# Patient Record
Sex: FEMALE | Race: WHITE | NOT HISPANIC OR LATINO | Employment: FULL TIME | URBAN - METROPOLITAN AREA
[De-identification: names, ages, dates, MRNs, and addresses within clinical notes are randomized per-mention and may not be internally consistent; named-entity substitution may affect disease eponyms.]

---

## 2017-11-07 ENCOUNTER — APPOINTMENT (OUTPATIENT)
Dept: WOMENS IMAGING | Facility: HOSPITAL | Age: 67
End: 2017-11-07

## 2017-11-07 PROCEDURE — G0202 SCR MAMMO BI INCL CAD: HCPCS | Performed by: RADIOLOGY

## 2017-11-29 ENCOUNTER — APPOINTMENT (OUTPATIENT)
Dept: GENERAL RADIOLOGY | Facility: HOSPITAL | Age: 67
End: 2017-11-29

## 2017-11-29 ENCOUNTER — HOSPITAL ENCOUNTER (OUTPATIENT)
Facility: HOSPITAL | Age: 67
Setting detail: OBSERVATION
Discharge: HOME | End: 2017-12-01
Attending: EMERGENCY MEDICINE | Admitting: INTERNAL MEDICINE

## 2017-11-29 DIAGNOSIS — E11.9 DIABETES MELLITUS, NEW ONSET (HCC): ICD-10-CM

## 2017-11-29 DIAGNOSIS — R73.9 HYPERGLYCEMIA: Primary | ICD-10-CM

## 2017-11-29 PROBLEM — E11.00 DIABETES MELLITUS WITH NONKETOTIC HYPEROSMOLARITY (HCC): Status: ACTIVE | Noted: 2017-11-29

## 2017-11-29 PROBLEM — I10 ESSENTIAL HYPERTENSION: Status: ACTIVE | Noted: 2017-11-29

## 2017-11-29 LAB
ALBUMIN SERPL-MCNC: 4.5 G/DL (ref 3.5–5.2)
ALBUMIN/GLOB SERPL: 1.6 G/DL
ALP SERPL-CCNC: 131 U/L (ref 39–117)
ALT SERPL W P-5'-P-CCNC: 29 U/L (ref 1–33)
ANION GAP SERPL CALCULATED.3IONS-SCNC: 12.1 MMOL/L
AST SERPL-CCNC: 22 U/L (ref 1–32)
BASOPHILS # BLD AUTO: 0.05 10*3/MM3 (ref 0–0.2)
BASOPHILS NFR BLD AUTO: 0.9 % (ref 0–1.5)
BILIRUB SERPL-MCNC: 0.4 MG/DL (ref 0.1–1.2)
BILIRUB UR QL STRIP: NEGATIVE
BUN BLD-MCNC: 16 MG/DL (ref 8–23)
BUN/CREAT SERPL: 18.4 (ref 7–25)
CALCIUM SPEC-SCNC: 9.5 MG/DL (ref 8.6–10.5)
CHLORIDE SERPL-SCNC: 92 MMOL/L (ref 98–107)
CLARITY UR: CLEAR
CO2 SERPL-SCNC: 26.9 MMOL/L (ref 22–29)
COLOR UR: YELLOW
CREAT BLD-MCNC: 0.87 MG/DL (ref 0.57–1)
DEPRECATED RDW RBC AUTO: 39.4 FL (ref 37–54)
EOSINOPHIL # BLD AUTO: 0.11 10*3/MM3 (ref 0–0.7)
EOSINOPHIL NFR BLD AUTO: 1.9 % (ref 0.3–6.2)
ERYTHROCYTE [DISTWIDTH] IN BLOOD BY AUTOMATED COUNT: 13 % (ref 11.7–13)
GFR SERPL CREATININE-BSD FRML MDRD: 65 ML/MIN/1.73
GLOBULIN UR ELPH-MCNC: 2.9 GM/DL
GLUCOSE BLD-MCNC: 611 MG/DL (ref 65–99)
GLUCOSE BLDC GLUCOMTR-MCNC: 258 MG/DL (ref 70–130)
GLUCOSE BLDC GLUCOMTR-MCNC: 342 MG/DL (ref 70–130)
GLUCOSE BLDC GLUCOMTR-MCNC: 352 MG/DL (ref 70–130)
GLUCOSE UR STRIP-MCNC: ABNORMAL MG/DL
HBA1C MFR BLD: 10.7 % (ref 4.8–5.6)
HCT VFR BLD AUTO: 35.4 % (ref 35.6–45.5)
HGB BLD-MCNC: 12.1 G/DL (ref 11.9–15.5)
HGB UR QL STRIP.AUTO: NEGATIVE
HOLD SPECIMEN: NORMAL
HOLD SPECIMEN: NORMAL
IMM GRANULOCYTES # BLD: 0 10*3/MM3 (ref 0–0.03)
IMM GRANULOCYTES NFR BLD: 0 % (ref 0–0.5)
KETONES UR QL STRIP: NEGATIVE
LEUKOCYTE ESTERASE UR QL STRIP.AUTO: NEGATIVE
LYMPHOCYTES # BLD AUTO: 1.81 10*3/MM3 (ref 0.9–4.8)
LYMPHOCYTES NFR BLD AUTO: 31 % (ref 19.6–45.3)
MAGNESIUM SERPL-MCNC: 2.1 MG/DL (ref 1.6–2.4)
MCH RBC QN AUTO: 28.5 PG (ref 26.9–32)
MCHC RBC AUTO-ENTMCNC: 34.2 G/DL (ref 32.4–36.3)
MCV RBC AUTO: 83.5 FL (ref 80.5–98.2)
MONOCYTES # BLD AUTO: 0.34 10*3/MM3 (ref 0.2–1.2)
MONOCYTES NFR BLD AUTO: 5.8 % (ref 5–12)
NEUTROPHILS # BLD AUTO: 3.52 10*3/MM3 (ref 1.9–8.1)
NEUTROPHILS NFR BLD AUTO: 60.4 % (ref 42.7–76)
NITRITE UR QL STRIP: NEGATIVE
PH UR STRIP.AUTO: <=5 [PH] (ref 5–8)
PLATELET # BLD AUTO: 258 10*3/MM3 (ref 140–500)
PMV BLD AUTO: 10.9 FL (ref 6–12)
POTASSIUM BLD-SCNC: 5.1 MMOL/L (ref 3.5–5.2)
PROT SERPL-MCNC: 7.4 G/DL (ref 6–8.5)
PROT UR QL STRIP: NEGATIVE
RBC # BLD AUTO: 4.24 10*6/MM3 (ref 3.9–5.2)
SODIUM BLD-SCNC: 131 MMOL/L (ref 136–145)
SP GR UR STRIP: >=1.03 (ref 1–1.03)
TROPONIN T SERPL-MCNC: <0.01 NG/ML (ref 0–0.03)
TSH SERPL DL<=0.05 MIU/L-ACNC: 4.48 MIU/ML (ref 0.27–4.2)
UROBILINOGEN UR QL STRIP: ABNORMAL
WBC NRBC COR # BLD: 5.83 10*3/MM3 (ref 4.5–10.7)
WHOLE BLOOD HOLD SPECIMEN: NORMAL
WHOLE BLOOD HOLD SPECIMEN: NORMAL

## 2017-11-29 PROCEDURE — 63710000001 INSULIN REGULAR HUMAN PER 5 UNITS: Performed by: EMERGENCY MEDICINE

## 2017-11-29 PROCEDURE — 85025 COMPLETE CBC W/AUTO DIFF WBC: CPT | Performed by: EMERGENCY MEDICINE

## 2017-11-29 PROCEDURE — 83036 HEMOGLOBIN GLYCOSYLATED A1C: CPT | Performed by: INTERNAL MEDICINE

## 2017-11-29 PROCEDURE — 99284 EMERGENCY DEPT VISIT MOD MDM: CPT

## 2017-11-29 PROCEDURE — 84484 ASSAY OF TROPONIN QUANT: CPT | Performed by: EMERGENCY MEDICINE

## 2017-11-29 PROCEDURE — G0378 HOSPITAL OBSERVATION PER HR: HCPCS

## 2017-11-29 PROCEDURE — 84443 ASSAY THYROID STIM HORMONE: CPT | Performed by: INTERNAL MEDICINE

## 2017-11-29 PROCEDURE — 93010 ELECTROCARDIOGRAM REPORT: CPT | Performed by: INTERNAL MEDICINE

## 2017-11-29 PROCEDURE — 83735 ASSAY OF MAGNESIUM: CPT | Performed by: EMERGENCY MEDICINE

## 2017-11-29 PROCEDURE — 71020 HC CHEST PA AND LATERAL: CPT

## 2017-11-29 PROCEDURE — 80053 COMPREHEN METABOLIC PANEL: CPT | Performed by: EMERGENCY MEDICINE

## 2017-11-29 PROCEDURE — 82962 GLUCOSE BLOOD TEST: CPT

## 2017-11-29 PROCEDURE — 93005 ELECTROCARDIOGRAM TRACING: CPT | Performed by: EMERGENCY MEDICINE

## 2017-11-29 PROCEDURE — 81003 URINALYSIS AUTO W/O SCOPE: CPT | Performed by: EMERGENCY MEDICINE

## 2017-11-29 PROCEDURE — 36415 COLL VENOUS BLD VENIPUNCTURE: CPT | Performed by: EMERGENCY MEDICINE

## 2017-11-29 PROCEDURE — 63710000001 INSULIN ASPART PER 5 UNITS: Performed by: INTERNAL MEDICINE

## 2017-11-29 RX ORDER — AMLODIPINE BESYLATE 5 MG/1
5 TABLET ORAL DAILY
Status: DISCONTINUED | OUTPATIENT
Start: 2017-11-30 | End: 2017-12-01 | Stop reason: HOSPADM

## 2017-11-29 RX ORDER — NICOTINE POLACRILEX 4 MG
15 LOZENGE BUCCAL
Status: DISCONTINUED | OUTPATIENT
Start: 2017-11-29 | End: 2017-12-01 | Stop reason: HOSPADM

## 2017-11-29 RX ORDER — LOVASTATIN 40 MG/1
40 TABLET ORAL DAILY
COMMUNITY

## 2017-11-29 RX ORDER — SODIUM CHLORIDE 9 MG/ML
75 INJECTION, SOLUTION INTRAVENOUS CONTINUOUS
Status: DISCONTINUED | OUTPATIENT
Start: 2017-11-29 | End: 2017-12-01 | Stop reason: HOSPADM

## 2017-11-29 RX ORDER — AMLODIPINE BESYLATE AND BENAZEPRIL HYDROCHLORIDE 5; 10 MG/1; MG/1
1 CAPSULE ORAL
Status: ON HOLD | COMMUNITY
End: 2018-04-03

## 2017-11-29 RX ORDER — ATORVASTATIN CALCIUM 20 MG/1
40 TABLET, FILM COATED ORAL DAILY
Status: DISCONTINUED | OUTPATIENT
Start: 2017-11-30 | End: 2017-12-01 | Stop reason: HOSPADM

## 2017-11-29 RX ORDER — ACETAMINOPHEN 325 MG/1
650 TABLET ORAL EVERY 4 HOURS PRN
Status: DISCONTINUED | OUTPATIENT
Start: 2017-11-29 | End: 2017-12-01 | Stop reason: HOSPADM

## 2017-11-29 RX ORDER — ONDANSETRON 2 MG/ML
4 INJECTION INTRAMUSCULAR; INTRAVENOUS EVERY 6 HOURS PRN
Status: DISCONTINUED | OUTPATIENT
Start: 2017-11-29 | End: 2017-12-01 | Stop reason: HOSPADM

## 2017-11-29 RX ORDER — SODIUM CHLORIDE 0.9 % (FLUSH) 0.9 %
1-10 SYRINGE (ML) INJECTION AS NEEDED
Status: DISCONTINUED | OUTPATIENT
Start: 2017-11-29 | End: 2017-12-01 | Stop reason: HOSPADM

## 2017-11-29 RX ORDER — LEVOTHYROXINE SODIUM 0.05 MG/1
50 TABLET ORAL
Status: DISCONTINUED | OUTPATIENT
Start: 2017-11-30 | End: 2017-12-01 | Stop reason: HOSPADM

## 2017-11-29 RX ORDER — SODIUM CHLORIDE 0.9 % (FLUSH) 0.9 %
10 SYRINGE (ML) INJECTION AS NEEDED
Status: DISCONTINUED | OUTPATIENT
Start: 2017-11-29 | End: 2017-12-01 | Stop reason: HOSPADM

## 2017-11-29 RX ORDER — LEVOTHYROXINE SODIUM 0.1 MG/1
50 TABLET ORAL DAILY
COMMUNITY

## 2017-11-29 RX ORDER — DEXTROSE MONOHYDRATE 25 G/50ML
25 INJECTION, SOLUTION INTRAVENOUS
Status: DISCONTINUED | OUTPATIENT
Start: 2017-11-29 | End: 2017-12-01 | Stop reason: HOSPADM

## 2017-11-29 RX ORDER — MELOXICAM 15 MG/1
15 TABLET ORAL DAILY
COMMUNITY
End: 2020-10-09 | Stop reason: HOSPADM

## 2017-11-29 RX ORDER — AMLODIPINE BESYLATE AND BENAZEPRIL HYDROCHLORIDE 5; 10 MG/1; MG/1
1 CAPSULE ORAL
Status: DISCONTINUED | OUTPATIENT
Start: 2017-11-30 | End: 2017-11-29 | Stop reason: CLARIF

## 2017-11-29 RX ORDER — LISINOPRIL 10 MG/1
10 TABLET ORAL DAILY
Status: DISCONTINUED | OUTPATIENT
Start: 2017-11-30 | End: 2017-12-01 | Stop reason: HOSPADM

## 2017-11-29 RX ADMIN — SODIUM CHLORIDE 1000 ML: 9 INJECTION, SOLUTION INTRAVENOUS at 15:32

## 2017-11-29 RX ADMIN — INSULIN ASPART 5 UNITS: 100 INJECTION, SOLUTION INTRAVENOUS; SUBCUTANEOUS at 18:22

## 2017-11-29 RX ADMIN — SODIUM CHLORIDE 125 ML/HR: 9 INJECTION, SOLUTION INTRAVENOUS at 16:45

## 2017-11-29 RX ADMIN — INSULIN ASPART 4 UNITS: 100 INJECTION, SOLUTION INTRAVENOUS; SUBCUTANEOUS at 22:01

## 2017-11-29 RX ADMIN — HUMAN INSULIN 5 UNITS: 100 INJECTION, SOLUTION SUBCUTANEOUS at 15:25

## 2017-11-30 PROBLEM — E03.9 HYPOTHYROIDISM: Status: ACTIVE | Noted: 2017-11-30

## 2017-11-30 LAB
ANION GAP SERPL CALCULATED.3IONS-SCNC: 12.5 MMOL/L
BASOPHILS # BLD AUTO: 0.04 10*3/MM3 (ref 0–0.2)
BASOPHILS NFR BLD AUTO: 0.6 % (ref 0–1.5)
BUN BLD-MCNC: 8 MG/DL (ref 8–23)
BUN/CREAT SERPL: 14.3 (ref 7–25)
CALCIUM SPEC-SCNC: 8.9 MG/DL (ref 8.6–10.5)
CHLORIDE SERPL-SCNC: 106 MMOL/L (ref 98–107)
CO2 SERPL-SCNC: 22.5 MMOL/L (ref 22–29)
CREAT BLD-MCNC: 0.56 MG/DL (ref 0.57–1)
DEPRECATED RDW RBC AUTO: 39.2 FL (ref 37–54)
EOSINOPHIL # BLD AUTO: 0.22 10*3/MM3 (ref 0–0.7)
EOSINOPHIL NFR BLD AUTO: 3.1 % (ref 0.3–6.2)
ERYTHROCYTE [DISTWIDTH] IN BLOOD BY AUTOMATED COUNT: 12.9 % (ref 11.7–13)
GFR SERPL CREATININE-BSD FRML MDRD: 109 ML/MIN/1.73
GLUCOSE BLD-MCNC: 171 MG/DL (ref 65–99)
GLUCOSE BLDC GLUCOMTR-MCNC: 252 MG/DL (ref 70–130)
GLUCOSE BLDC GLUCOMTR-MCNC: 266 MG/DL (ref 70–130)
GLUCOSE BLDC GLUCOMTR-MCNC: 90 MG/DL (ref 70–130)
HCT VFR BLD AUTO: 35 % (ref 35.6–45.5)
HGB BLD-MCNC: 11.8 G/DL (ref 11.9–15.5)
IMM GRANULOCYTES # BLD: 0 10*3/MM3 (ref 0–0.03)
IMM GRANULOCYTES NFR BLD: 0 % (ref 0–0.5)
LYMPHOCYTES # BLD AUTO: 1.64 10*3/MM3 (ref 0.9–4.8)
LYMPHOCYTES NFR BLD AUTO: 23.5 % (ref 19.6–45.3)
MAGNESIUM SERPL-MCNC: 1.9 MG/DL (ref 1.6–2.4)
MCH RBC QN AUTO: 28.4 PG (ref 26.9–32)
MCHC RBC AUTO-ENTMCNC: 33.7 G/DL (ref 32.4–36.3)
MCV RBC AUTO: 84.3 FL (ref 80.5–98.2)
MONOCYTES # BLD AUTO: 0.42 10*3/MM3 (ref 0.2–1.2)
MONOCYTES NFR BLD AUTO: 6 % (ref 5–12)
NEUTROPHILS # BLD AUTO: 4.67 10*3/MM3 (ref 1.9–8.1)
NEUTROPHILS NFR BLD AUTO: 66.8 % (ref 42.7–76)
PHOSPHATE SERPL-MCNC: 3.8 MG/DL (ref 2.5–4.5)
PLATELET # BLD AUTO: 241 10*3/MM3 (ref 140–500)
PMV BLD AUTO: 11 FL (ref 6–12)
POTASSIUM BLD-SCNC: 3.8 MMOL/L (ref 3.5–5.2)
RBC # BLD AUTO: 4.15 10*6/MM3 (ref 3.9–5.2)
SODIUM BLD-SCNC: 141 MMOL/L (ref 136–145)
WBC NRBC COR # BLD: 6.99 10*3/MM3 (ref 4.5–10.7)

## 2017-11-30 PROCEDURE — 96372 THER/PROPH/DIAG INJ SC/IM: CPT

## 2017-11-30 PROCEDURE — 63710000001 INSULIN DETEMER PER 5 UNITS: Performed by: INTERNAL MEDICINE

## 2017-11-30 PROCEDURE — 83735 ASSAY OF MAGNESIUM: CPT | Performed by: INTERNAL MEDICINE

## 2017-11-30 PROCEDURE — 82962 GLUCOSE BLOOD TEST: CPT

## 2017-11-30 PROCEDURE — 25010000002 ENOXAPARIN PER 10 MG: Performed by: INTERNAL MEDICINE

## 2017-11-30 PROCEDURE — 80048 BASIC METABOLIC PNL TOTAL CA: CPT | Performed by: INTERNAL MEDICINE

## 2017-11-30 PROCEDURE — G0378 HOSPITAL OBSERVATION PER HR: HCPCS

## 2017-11-30 PROCEDURE — 63710000001 INSULIN ASPART PER 5 UNITS: Performed by: INTERNAL MEDICINE

## 2017-11-30 PROCEDURE — 96360 HYDRATION IV INFUSION INIT: CPT

## 2017-11-30 PROCEDURE — 84100 ASSAY OF PHOSPHORUS: CPT | Performed by: INTERNAL MEDICINE

## 2017-11-30 PROCEDURE — 96361 HYDRATE IV INFUSION ADD-ON: CPT

## 2017-11-30 PROCEDURE — 85025 COMPLETE CBC W/AUTO DIFF WBC: CPT | Performed by: INTERNAL MEDICINE

## 2017-11-30 RX ADMIN — INSULIN ASPART 4 UNITS: 100 INJECTION, SOLUTION INTRAVENOUS; SUBCUTANEOUS at 21:45

## 2017-11-30 RX ADMIN — ATORVASTATIN CALCIUM 40 MG: 20 TABLET, FILM COATED ORAL at 08:26

## 2017-11-30 RX ADMIN — METOPROLOL TARTRATE 25 MG: 25 TABLET ORAL at 00:23

## 2017-11-30 RX ADMIN — INSULIN DETEMIR 15 UNITS: 100 INJECTION, SOLUTION SUBCUTANEOUS at 21:46

## 2017-11-30 RX ADMIN — LISINOPRIL 10 MG: 10 TABLET ORAL at 08:26

## 2017-11-30 RX ADMIN — ENOXAPARIN SODIUM 40 MG: 40 INJECTION SUBCUTANEOUS at 00:23

## 2017-11-30 RX ADMIN — INSULIN ASPART 2 UNITS: 100 INJECTION, SOLUTION INTRAVENOUS; SUBCUTANEOUS at 18:29

## 2017-11-30 RX ADMIN — METOPROLOL TARTRATE 25 MG: 25 TABLET ORAL at 08:26

## 2017-11-30 RX ADMIN — SODIUM CHLORIDE 125 ML/HR: 9 INJECTION, SOLUTION INTRAVENOUS at 16:01

## 2017-11-30 RX ADMIN — METFORMIN HYDROCHLORIDE 500 MG: 500 TABLET ORAL at 18:29

## 2017-11-30 RX ADMIN — METOPROLOL TARTRATE 25 MG: 25 TABLET ORAL at 21:44

## 2017-11-30 RX ADMIN — LEVOTHYROXINE SODIUM 50 MCG: 50 TABLET ORAL at 08:26

## 2017-11-30 RX ADMIN — INSULIN ASPART 5 UNITS: 100 INJECTION, SOLUTION INTRAVENOUS; SUBCUTANEOUS at 08:26

## 2017-11-30 RX ADMIN — INSULIN ASPART 2 UNITS: 100 INJECTION, SOLUTION INTRAVENOUS; SUBCUTANEOUS at 08:26

## 2017-11-30 RX ADMIN — INSULIN ASPART 4 UNITS: 100 INJECTION, SOLUTION INTRAVENOUS; SUBCUTANEOUS at 11:56

## 2017-11-30 RX ADMIN — INSULIN ASPART 5 UNITS: 100 INJECTION, SOLUTION INTRAVENOUS; SUBCUTANEOUS at 11:55

## 2017-11-30 RX ADMIN — SODIUM CHLORIDE 125 ML/HR: 9 INJECTION, SOLUTION INTRAVENOUS at 00:24

## 2017-11-30 RX ADMIN — ENOXAPARIN SODIUM 40 MG: 40 INJECTION SUBCUTANEOUS at 23:11

## 2017-11-30 RX ADMIN — INSULIN DETEMIR 15 UNITS: 100 INJECTION, SOLUTION SUBCUTANEOUS at 01:09

## 2017-11-30 RX ADMIN — SODIUM CHLORIDE 125 ML/HR: 9 INJECTION, SOLUTION INTRAVENOUS at 07:46

## 2017-11-30 RX ADMIN — AMLODIPINE BESYLATE 5 MG: 5 TABLET ORAL at 08:26

## 2017-12-01 VITALS
WEIGHT: 151.6 LBS | HEART RATE: 80 BPM | BODY MASS INDEX: 24.36 KG/M2 | HEIGHT: 66 IN | TEMPERATURE: 97.5 F | OXYGEN SATURATION: 98 % | SYSTOLIC BLOOD PRESSURE: 137 MMHG | RESPIRATION RATE: 16 BRPM | DIASTOLIC BLOOD PRESSURE: 78 MMHG

## 2017-12-01 LAB
ANION GAP SERPL CALCULATED.3IONS-SCNC: 11.4 MMOL/L
BUN BLD-MCNC: 7 MG/DL (ref 8–23)
BUN/CREAT SERPL: 10.9 (ref 7–25)
CALCIUM SPEC-SCNC: 8.9 MG/DL (ref 8.6–10.5)
CHLORIDE SERPL-SCNC: 100 MMOL/L (ref 98–107)
CO2 SERPL-SCNC: 23.6 MMOL/L (ref 22–29)
CREAT BLD-MCNC: 0.64 MG/DL (ref 0.57–1)
GFR SERPL CREATININE-BSD FRML MDRD: 93 ML/MIN/1.73
GLUCOSE BLD-MCNC: 185 MG/DL (ref 65–99)
GLUCOSE BLDC GLUCOMTR-MCNC: 181 MG/DL (ref 70–130)
POTASSIUM BLD-SCNC: 3.4 MMOL/L (ref 3.5–5.2)
SODIUM BLD-SCNC: 135 MMOL/L (ref 136–145)

## 2017-12-01 PROCEDURE — 80048 BASIC METABOLIC PNL TOTAL CA: CPT | Performed by: INTERNAL MEDICINE

## 2017-12-01 PROCEDURE — 63710000001 INSULIN ASPART PER 5 UNITS: Performed by: INTERNAL MEDICINE

## 2017-12-01 PROCEDURE — G0378 HOSPITAL OBSERVATION PER HR: HCPCS

## 2017-12-01 PROCEDURE — 96361 HYDRATE IV INFUSION ADD-ON: CPT

## 2017-12-01 PROCEDURE — 82962 GLUCOSE BLOOD TEST: CPT

## 2017-12-01 RX ORDER — ACETAMINOPHEN 325 MG/1
650 TABLET ORAL EVERY 4 HOURS PRN
Status: ON HOLD
Start: 2017-12-01 | End: 2018-04-03

## 2017-12-01 RX ORDER — POTASSIUM CHLORIDE 750 MG/1
30 CAPSULE, EXTENDED RELEASE ORAL ONCE
Status: COMPLETED | OUTPATIENT
Start: 2017-12-01 | End: 2017-12-01

## 2017-12-01 RX ADMIN — SODIUM CHLORIDE 75 ML/HR: 9 INJECTION, SOLUTION INTRAVENOUS at 03:45

## 2017-12-01 RX ADMIN — INSULIN ASPART 2 UNITS: 100 INJECTION, SOLUTION INTRAVENOUS; SUBCUTANEOUS at 08:18

## 2017-12-01 RX ADMIN — LISINOPRIL 10 MG: 10 TABLET ORAL at 08:17

## 2017-12-01 RX ADMIN — AMLODIPINE BESYLATE 5 MG: 5 TABLET ORAL at 08:17

## 2017-12-01 RX ADMIN — LEVOTHYROXINE SODIUM 50 MCG: 50 TABLET ORAL at 06:42

## 2017-12-01 RX ADMIN — METOPROLOL TARTRATE 25 MG: 25 TABLET ORAL at 08:17

## 2017-12-01 RX ADMIN — POTASSIUM CHLORIDE 30 MEQ: 750 CAPSULE, EXTENDED RELEASE ORAL at 08:17

## 2017-12-01 RX ADMIN — METFORMIN HYDROCHLORIDE 500 MG: 500 TABLET ORAL at 08:17

## 2017-12-01 RX ADMIN — ATORVASTATIN CALCIUM 40 MG: 20 TABLET, FILM COATED ORAL at 08:17

## 2017-12-01 NOTE — DISCHARGE SUMMARY
NAME: Vanessa Dorsey ADMIT: 2017   : 1950  PCP: DANY Harley MD    MRN: [id number] LOS: 2 days   AGE/SEX: 67 y.o. female  ROOM: [facility]     Date of Admission:  2017  Date of Discharge:  2017    PCP: DANY Harley MD    CHIEF COMPLAINT  Weakness - Generalized      DISCHARGE DIAGNOSIS  Active Hospital Problems (** Indicates Principal Problem)    Diagnosis Date Noted   • **Diabetes mellitus with nonketotic hyperosmolarity [E11.00] 2017   • Hypothyroidism [E03.9] 2017   • Hyperglycemia [R73.9] 2017   • Essential hypertension [I10] 2017      Resolved Hospital Problems    Diagnosis Date Noted Date Resolved   No resolved problems to display.       SECONDARY DIAGNOSES  Past Medical History:   Diagnosis Date   • Diabetes mellitus    • Disease of thyroid gland    • Hyperlipidemia    • Hypertension      HOSPITAL COURSE  Ms. Dorsey is a 67 y.o. female who has been admitted with hyperglycemia with BG 600s and new diagnosis of DM2 (she may have been pre-diabetic before). She was started on levemir insulin qhs, given aggressive IVFs, as well as meal time insulin initially. She has had improvement in her BG and will be discharging her on levemir and an increased dose of metformin. She will follow closely with DANY Harley MD for titration of her DM medications for improved BG control.         DIAGNOSTICS   Basic Metabolic Panel [[id number]] (Abnormal) Collected: 17 0616       Lab Status: Final result Specimen: Blood Updated: 17 0733        Glucose 185 (H) mg/dL         BUN 7 (L) mg/dL         Creatinine 0.64 mg/dL         Sodium 135 (L) mmol/L         Potassium 3.4 (L) mmol/L         Chloride 100 mmol/L         CO2 23.6 mmol/L         Calcium 8.9 mg/dL         eGFR Non African Amer 93 mL/min/1.73         BUN/Creatinine Ratio 10.9        Anion Gap 11.4 mmol/L       Hemoglobin A1c [[id number]] (Abnormal) Collected: 17 1402        Lab Status: Final result  Specimen: Blood Updated: 11/29/17 1804        Hemoglobin A1C 10.70 (H) %        Narrative:         Hemoglobin A1C Ranges:    Increased Risk for Diabetes  5.7% to 6.4%  Diabetes                     >= 6.5%  Diabetic Goal                < 7.0%       TSH [[id number]] (Abnormal) Collected: 11/29/17 1402       Lab Status: Final result Specimen: Blood Updated: 11/29/17 2304        TSH 4.480 (H) mIU/mL             PHYSICAL EXAM  Objective     Alert, nad  Soft, nt  RRR  Pleasant, appropriate    CONDITION ON DISCHARGE  Stable.      DISCHARGE DISPOSITION   Home or Self Care      DISCHARGE MEDICATIONS   Vanessa Dorsey   Home Medication Instructions JOSE:[id number]    Printed on:12/01/17 0870   Medication Information                      acetaminophen (TYLENOL) 325 MG tablet  Take 2 tablets by mouth Every 4 (Four) Hours As Needed for Mild Pain .             amLODIPine-benazepril (LOTREL 5-10) 5-10 MG per capsule  Take 1 capsule by mouth.             insulin detemir (LEVEMIR) 100 UNIT/ML injection  Inject 15 Units under the skin Every Night. Dispense needles and supplies for levemir pen             levothyroxine (SYNTHROID, LEVOTHROID) 100 MCG tablet  Take 50 mcg by mouth.             lovastatin (MEVACOR) 40 MG tablet  Take 40 mg by mouth.             meloxicam (MOBIC) 15 MG tablet  Take 15 mg by mouth Daily.             metFORMIN (GLUCOPHAGE) 1000 MG tablet  Take 1 tablet by mouth 2 (Two) Times a Day With Meals.             metoprolol tartrate (LOPRESSOR) 25 MG tablet  Take  by mouth.                No future appointments.  Additional Instructions for the Follow-ups that You Need to Schedule     Discharge Follow-up with PCP    As directed    Follow Up Details:  PCP in 1-2 weeks             Follow-up Information     Follow up with DANY Harley MD .    Specialty:  General Practice    Why:  PCP in 1-2 weeks    Contact information:    [address] [address] [phone]            TEST   RESULTS PENDING AT DISCHARGE         Tae Byrd MD  [facility]  12/01/17  8:50 AM      Time: greater than 32 minutes on discharge  It was a pleasure taking care of this patient while in the hospital.

## 2017-12-01 NOTE — PLAN OF CARE
Problem: Patient Care Overview (Adult)  Goal: Plan of Care Review  Outcome: Outcome(s) achieved Date Met:  12/01/17 12/01/17 0952   Coping/Psychosocial Response Interventions   Plan Of Care Reviewed With patient   Patient Care Overview   Progress improving   Outcome Evaluation   Outcome Summary/Follow up Plan VSS. Pt with DM educ now, admin own insulin well. Less anxious.        Goal: Adult Individualization and Mutuality  Outcome: Outcome(s) achieved Date Met:  12/01/17  Goal: Discharge Needs Assessment  Outcome: Outcome(s) achieved Date Met:  12/01/17    Problem: Diabetes, Type 2 (Adult)  Goal: Signs and Symptoms of Listed Potential Problems Will be Absent or Manageable (Diabetes, Type 2)  Outcome: Outcome(s) achieved Date Met:  12/01/17

## 2017-12-01 NOTE — PLAN OF CARE
Problem: Patient Care Overview (Adult)  Goal: Plan of Care Review  Outcome: Ongoing (interventions implemented as appropriate)    12/01/17 0401   Coping/Psychosocial Response Interventions   Plan Of Care Reviewed With patient   Patient Care Overview   Progress no change   Outcome Evaluation   Outcome Summary/Follow up Plan pt administered her own insulin, states she is feeling less anxiety and more comfortable with newly diagnosed dm, evening blood sugar =266, slept for long intervals throughout the night, VSS       Goal: Adult Individualization and Mutuality  Outcome: Ongoing (interventions implemented as appropriate)  Goal: Discharge Needs Assessment  Outcome: Ongoing (interventions implemented as appropriate)    Problem: Diabetes, Type 2 (Adult)  Goal: Signs and Symptoms of Listed Potential Problems Will be Absent or Manageable (Diabetes, Type 2)  Outcome: Ongoing (interventions implemented as appropriate)

## 2017-12-01 NOTE — PROGRESS NOTES
Continued Stay Note  [facility]     Patient Name: Vanessa Dorsey  MRN: [id number]  Today's Date: 12/1/2017    Admit Date: 11/29/2017          Discharge Plan     None              Discharge Codes       12/01/17 0903    Discharge Codes    Discharge Codes 01  Discharge to home        Expected Discharge Date and Time     Expected Discharge Date Expected Discharge Time    Dec 1, 2017             Monica Fernandez RN

## 2017-12-01 NOTE — NURSING NOTE
"Diabetes Education  Assessment/Teaching    Patient Name:  Vanessa Dorsey  YOB: 1950  MRN: [id number]  Admit Date:  11/29/2017      Assessment Date:  11/30/17       Most Recent Value    General Information      Referral From:  MD order    Height  5' 6\" (1.676 m)    Height Method  Stated    Weight  151 lb 9.6 oz (68.8 kg)    What type of diabetes do you have?  Type 2    Barriers to Learning  -- [no barriers observed this morning.]    Nutrition Information Review meal plan with pt this morning.    Assessment Topics     Healthy Eating - Assessment  Needs education    Taking Medication - Assessment  Needs education    Problem Solving - Assessment  Needs education    Healthy Coping - Assessment  Competent    Monitoring - Assessment  Competent [pt taught and returned demo yesterday at bedside. ]    DM Goals To f/u with PCP within one week and take BG log or BG results.               Most Recent Value    DM Education Needs     Meter  Meter provided    Meter Type  One Touch    Frequency of Testing  AC meals    Blood Glucose Target Range      Medication  Insulin, Oral [Reinforce Levemir name and metformin dose/scheduling.]    Problem Solving  Hypoglycemia, Symptoms, Treatment    Healthy Coping  Appropriate    Discharge Plan  Home, Follow-up with PCP    Motivation  Engaged    Teaching Method  Discussion, Explanation, Handouts, Teach back    Patient Response  Verbalized understanding      Encourage pt to call RD or DM RN educator with f/u questions. Pt reports plan to come for outpt DM ed classes here.     Electronically signed by:  Inga Law, RN, BSN, CDE   12/01/17 10:38 AM  "

## 2017-12-11 ENCOUNTER — TRANSCRIBE ORDERS (OUTPATIENT)
Dept: DIABETES SERVICES | Facility: HOSPITAL | Age: 67
End: 2017-12-11

## 2017-12-11 DIAGNOSIS — E11.9 DIABETES MELLITUS WITHOUT COMPLICATION (HCC): Primary | ICD-10-CM

## 2017-12-12 ENCOUNTER — HOSPITAL ENCOUNTER (OUTPATIENT)
Dept: DIABETES SERVICES | Facility: HOSPITAL | Age: 67
Setting detail: RECURRING SERIES
Discharge: HOME | End: 2017-12-12

## 2017-12-12 PROCEDURE — G0109 DIAB MANAGE TRN IND/GROUP: HCPCS

## 2018-01-09 ENCOUNTER — HOSPITAL ENCOUNTER (OUTPATIENT)
Dept: DIABETES SERVICES | Facility: HOSPITAL | Age: 68
Setting detail: RECURRING SERIES
Discharge: HOME | End: 2018-01-09

## 2018-01-09 PROCEDURE — G0109 DIAB MANAGE TRN IND/GROUP: HCPCS

## 2018-01-16 ENCOUNTER — APPOINTMENT (OUTPATIENT)
Dept: DIABETES SERVICES | Facility: HOSPITAL | Age: 68
End: 2018-01-16

## 2018-01-23 ENCOUNTER — APPOINTMENT (OUTPATIENT)
Dept: DIABETES SERVICES | Facility: HOSPITAL | Age: 68
End: 2018-01-23

## 2018-02-13 ENCOUNTER — HOSPITAL ENCOUNTER (OUTPATIENT)
Dept: DIABETES SERVICES | Facility: HOSPITAL | Age: 68
Setting detail: RECURRING SERIES
Discharge: HOME | End: 2018-02-13

## 2018-02-13 PROCEDURE — G0109 DIAB MANAGE TRN IND/GROUP: HCPCS

## 2018-02-20 ENCOUNTER — HOSPITAL ENCOUNTER (OUTPATIENT)
Dept: DIABETES SERVICES | Facility: HOSPITAL | Age: 68
Setting detail: RECURRING SERIES
Discharge: HOME | End: 2018-02-20

## 2018-04-03 ENCOUNTER — APPOINTMENT (OUTPATIENT)
Dept: GENERAL RADIOLOGY | Facility: HOSPITAL | Age: 68
End: 2018-04-03
Attending: INTERNAL MEDICINE

## 2018-04-03 ENCOUNTER — HOSPITAL ENCOUNTER (OUTPATIENT)
Facility: HOSPITAL | Age: 68
Setting detail: OBSERVATION
Discharge: HOME | End: 2018-04-04
Attending: INTERNAL MEDICINE | Admitting: INTERNAL MEDICINE

## 2018-04-03 PROBLEM — E86.0 DEHYDRATION: Status: ACTIVE | Noted: 2018-04-03

## 2018-04-03 PROBLEM — B34.9 VIRAL ILLNESS: Status: ACTIVE | Noted: 2018-04-03

## 2018-04-03 PROBLEM — E11.65 TYPE 2 DIABETES MELLITUS WITH HYPERGLYCEMIA (HCC): Status: ACTIVE | Noted: 2017-11-29

## 2018-04-03 PROBLEM — N39.0 UTI (URINARY TRACT INFECTION): Status: ACTIVE | Noted: 2018-04-03

## 2018-04-03 LAB
ALBUMIN SERPL-MCNC: 3.2 G/DL (ref 3.5–5.2)
ALBUMIN/GLOB SERPL: 1 G/DL
ALP SERPL-CCNC: 613 U/L (ref 39–117)
ALT SERPL W P-5'-P-CCNC: 423 U/L (ref 1–33)
ANION GAP SERPL CALCULATED.3IONS-SCNC: 13.4 MMOL/L
ARTERIAL PATENCY WRIST A: ABNORMAL
AST SERPL-CCNC: 219 U/L (ref 1–32)
ATMOSPHERIC PRESS: 743.3 MMHG
B PERT DNA SPEC QL NAA+PROBE: NOT DETECTED
B-OH-BUTYR SERPL-SCNC: 0.14 MMOL/L (ref 0.02–0.27)
BACTERIA UR QL AUTO: ABNORMAL /HPF
BASE EXCESS BLDA CALC-SCNC: -0.6 MMOL/L (ref 0–2)
BASOPHILS # BLD AUTO: 0.01 10*3/MM3 (ref 0–0.2)
BASOPHILS NFR BLD AUTO: 0.1 % (ref 0–1.5)
BDY SITE: ABNORMAL
BILIRUB SERPL-MCNC: 0.8 MG/DL (ref 0.1–1.2)
BILIRUB UR QL STRIP: NEGATIVE
BUN BLD-MCNC: 16 MG/DL (ref 8–23)
BUN/CREAT SERPL: 20.8 (ref 7–25)
C PNEUM DNA NPH QL NAA+NON-PROBE: NOT DETECTED
CALCIUM SPEC-SCNC: 9.3 MG/DL (ref 8.6–10.5)
CHLORIDE SERPL-SCNC: 93 MMOL/L (ref 98–107)
CLARITY UR: CLEAR
CO2 SERPL-SCNC: 24.6 MMOL/L (ref 22–29)
COLOR UR: YELLOW
CREAT BLD-MCNC: 0.77 MG/DL (ref 0.57–1)
DEPRECATED RDW RBC AUTO: 41.7 FL (ref 37–54)
EOSINOPHIL # BLD AUTO: 0.07 10*3/MM3 (ref 0–0.7)
EOSINOPHIL NFR BLD AUTO: 0.7 % (ref 0.3–6.2)
ERYTHROCYTE [DISTWIDTH] IN BLOOD BY AUTOMATED COUNT: 14.3 % (ref 11.7–13)
FLUAV H1 2009 PAND RNA NPH QL NAA+PROBE: NOT DETECTED
FLUAV H1 HA GENE NPH QL NAA+PROBE: NOT DETECTED
FLUAV H3 RNA NPH QL NAA+PROBE: NOT DETECTED
FLUAV SUBTYP SPEC NAA+PROBE: NOT DETECTED
FLUBV RNA ISLT QL NAA+PROBE: NOT DETECTED
GFR SERPL CREATININE-BSD FRML MDRD: 75 ML/MIN/1.73
GLOBULIN UR ELPH-MCNC: 3.2 GM/DL
GLUCOSE BLD-MCNC: 126 MG/DL (ref 65–99)
GLUCOSE BLDC GLUCOMTR-MCNC: 121 MG/DL (ref 70–130)
GLUCOSE BLDC GLUCOMTR-MCNC: 209 MG/DL (ref 70–130)
GLUCOSE BLDC GLUCOMTR-MCNC: 211 MG/DL (ref 70–130)
GLUCOSE UR STRIP-MCNC: ABNORMAL MG/DL
HADV DNA SPEC NAA+PROBE: NOT DETECTED
HCO3 BLDA-SCNC: 22.5 MMOL/L (ref 22–28)
HCOV 229E RNA SPEC QL NAA+PROBE: NOT DETECTED
HCOV HKU1 RNA SPEC QL NAA+PROBE: NOT DETECTED
HCOV NL63 RNA SPEC QL NAA+PROBE: NOT DETECTED
HCOV OC43 RNA SPEC QL NAA+PROBE: NOT DETECTED
HCT VFR BLD AUTO: 32.2 % (ref 35.6–45.5)
HGB BLD-MCNC: 11.1 G/DL (ref 11.9–15.5)
HGB UR QL STRIP.AUTO: NEGATIVE
HMPV RNA NPH QL NAA+NON-PROBE: NOT DETECTED
HPIV1 RNA SPEC QL NAA+PROBE: NOT DETECTED
HPIV2 RNA SPEC QL NAA+PROBE: NOT DETECTED
HPIV3 RNA NPH QL NAA+PROBE: NOT DETECTED
HPIV4 P GENE NPH QL NAA+PROBE: NOT DETECTED
HYALINE CASTS UR QL AUTO: ABNORMAL /LPF
IMM GRANULOCYTES # BLD: 0.36 10*3/MM3 (ref 0–0.03)
IMM GRANULOCYTES NFR BLD: 3.7 % (ref 0–0.5)
KETONES UR QL STRIP: NEGATIVE
LEUKOCYTE ESTERASE UR QL STRIP.AUTO: ABNORMAL
LYMPHOCYTES # BLD AUTO: 1 10*3/MM3 (ref 0.9–4.8)
LYMPHOCYTES NFR BLD AUTO: 10.3 % (ref 19.6–45.3)
M PNEUMO IGG SER IA-ACNC: NOT DETECTED
MAGNESIUM SERPL-MCNC: 1.6 MG/DL (ref 1.6–2.4)
MCH RBC QN AUTO: 27.9 PG (ref 26.9–32)
MCHC RBC AUTO-ENTMCNC: 34.5 G/DL (ref 32.4–36.3)
MCV RBC AUTO: 80.9 FL (ref 80.5–98.2)
MODALITY: ABNORMAL
MONOCYTES # BLD AUTO: 0.43 10*3/MM3 (ref 0.2–1.2)
MONOCYTES NFR BLD AUTO: 4.4 % (ref 5–12)
NEUTROPHILS # BLD AUTO: 7.82 10*3/MM3 (ref 1.9–8.1)
NEUTROPHILS NFR BLD AUTO: 80.8 % (ref 42.7–76)
NITRITE UR QL STRIP: NEGATIVE
PCO2 BLDA: 31.2 MM HG (ref 35–45)
PH BLDA: 7.47 PH UNITS (ref 7.35–7.45)
PH UR STRIP.AUTO: 7 [PH] (ref 5–8)
PLATELET # BLD AUTO: 370 10*3/MM3 (ref 140–500)
PMV BLD AUTO: 10.1 FL (ref 6–12)
PO2 BLDA: 72.4 MM HG (ref 80–100)
POTASSIUM BLD-SCNC: 3.8 MMOL/L (ref 3.5–5.2)
PROT SERPL-MCNC: 6.4 G/DL (ref 6–8.5)
PROT UR QL STRIP: ABNORMAL
RBC # BLD AUTO: 3.98 10*6/MM3 (ref 3.9–5.2)
RBC # UR: ABNORMAL /HPF
REF LAB TEST METHOD: ABNORMAL
RHINOVIRUS RNA SPEC NAA+PROBE: NOT DETECTED
RSV RNA NPH QL NAA+NON-PROBE: NOT DETECTED
S PYO AG THROAT QL: NEGATIVE
SAO2 % BLDCOA: 95.5 % (ref 92–99)
SET MECH RESP RATE: 16
SODIUM BLD-SCNC: 131 MMOL/L (ref 136–145)
SP GR UR STRIP: 1.02 (ref 1–1.03)
SQUAMOUS #/AREA URNS HPF: ABNORMAL /HPF
TOTAL RATE: 16 BREATHS/MINUTE
UROBILINOGEN UR QL STRIP: ABNORMAL
WBC NRBC COR # BLD: 9.69 10*3/MM3 (ref 4.5–10.7)
WBC UR QL AUTO: ABNORMAL /HPF

## 2018-04-03 PROCEDURE — 63710000001 INSULIN ASPART PER 5 UNITS: Performed by: INTERNAL MEDICINE

## 2018-04-03 PROCEDURE — 82962 GLUCOSE BLOOD TEST: CPT

## 2018-04-03 PROCEDURE — 87581 M.PNEUMON DNA AMP PROBE: CPT | Performed by: INTERNAL MEDICINE

## 2018-04-03 PROCEDURE — 83735 ASSAY OF MAGNESIUM: CPT | Performed by: INTERNAL MEDICINE

## 2018-04-03 PROCEDURE — 87798 DETECT AGENT NOS DNA AMP: CPT | Performed by: INTERNAL MEDICINE

## 2018-04-03 PROCEDURE — 87880 STREP A ASSAY W/OPTIC: CPT | Performed by: INTERNAL MEDICINE

## 2018-04-03 PROCEDURE — G0378 HOSPITAL OBSERVATION PER HR: HCPCS

## 2018-04-03 PROCEDURE — 71046 X-RAY EXAM CHEST 2 VIEWS: CPT

## 2018-04-03 PROCEDURE — 87086 URINE CULTURE/COLONY COUNT: CPT | Performed by: INTERNAL MEDICINE

## 2018-04-03 PROCEDURE — 96372 THER/PROPH/DIAG INJ SC/IM: CPT

## 2018-04-03 PROCEDURE — 96361 HYDRATE IV INFUSION ADD-ON: CPT

## 2018-04-03 PROCEDURE — 36600 WITHDRAWAL OF ARTERIAL BLOOD: CPT

## 2018-04-03 PROCEDURE — 25010000002 CEFTRIAXONE PER 250 MG: Performed by: INTERNAL MEDICINE

## 2018-04-03 PROCEDURE — 87081 CULTURE SCREEN ONLY: CPT | Performed by: INTERNAL MEDICINE

## 2018-04-03 PROCEDURE — 90791 PSYCH DIAGNOSTIC EVALUATION: CPT | Performed by: SOCIAL WORKER

## 2018-04-03 PROCEDURE — 82010 KETONE BODYS QUAN: CPT | Performed by: INTERNAL MEDICINE

## 2018-04-03 PROCEDURE — 87486 CHLMYD PNEUM DNA AMP PROBE: CPT | Performed by: INTERNAL MEDICINE

## 2018-04-03 PROCEDURE — 25010000002 ENOXAPARIN PER 10 MG: Performed by: INTERNAL MEDICINE

## 2018-04-03 PROCEDURE — 85025 COMPLETE CBC W/AUTO DIFF WBC: CPT | Performed by: INTERNAL MEDICINE

## 2018-04-03 PROCEDURE — 80053 COMPREHEN METABOLIC PANEL: CPT | Performed by: INTERNAL MEDICINE

## 2018-04-03 PROCEDURE — 93010 ELECTROCARDIOGRAM REPORT: CPT | Performed by: INTERNAL MEDICINE

## 2018-04-03 PROCEDURE — 87633 RESP VIRUS 12-25 TARGETS: CPT | Performed by: INTERNAL MEDICINE

## 2018-04-03 PROCEDURE — 82803 BLOOD GASES ANY COMBINATION: CPT

## 2018-04-03 PROCEDURE — 93005 ELECTROCARDIOGRAM TRACING: CPT | Performed by: INTERNAL MEDICINE

## 2018-04-03 PROCEDURE — 81001 URINALYSIS AUTO W/SCOPE: CPT | Performed by: INTERNAL MEDICINE

## 2018-04-03 RX ORDER — DEXTROSE MONOHYDRATE 25 G/50ML
25 INJECTION, SOLUTION INTRAVENOUS
Status: DISCONTINUED | OUTPATIENT
Start: 2018-04-03 | End: 2018-04-04 | Stop reason: HOSPADM

## 2018-04-03 RX ORDER — METOPROLOL SUCCINATE 25 MG/1
25 TABLET, EXTENDED RELEASE ORAL DAILY
COMMUNITY

## 2018-04-03 RX ORDER — POTASSIUM CHLORIDE 7.45 MG/ML
10 INJECTION INTRAVENOUS
Status: DISCONTINUED | OUTPATIENT
Start: 2018-04-03 | End: 2018-04-04 | Stop reason: HOSPADM

## 2018-04-03 RX ORDER — ONDANSETRON 4 MG/1
4 TABLET, ORALLY DISINTEGRATING ORAL EVERY 6 HOURS PRN
Status: DISCONTINUED | OUTPATIENT
Start: 2018-04-03 | End: 2018-04-04 | Stop reason: HOSPADM

## 2018-04-03 RX ORDER — ACETAMINOPHEN 325 MG/1
650 TABLET ORAL EVERY 4 HOURS PRN
Status: DISCONTINUED | OUTPATIENT
Start: 2018-04-03 | End: 2018-04-04 | Stop reason: HOSPADM

## 2018-04-03 RX ORDER — ONDANSETRON 2 MG/ML
4 INJECTION INTRAMUSCULAR; INTRAVENOUS EVERY 6 HOURS PRN
Status: DISCONTINUED | OUTPATIENT
Start: 2018-04-03 | End: 2018-04-04 | Stop reason: HOSPADM

## 2018-04-03 RX ORDER — SODIUM CHLORIDE 9 MG/ML
125 INJECTION, SOLUTION INTRAVENOUS CONTINUOUS
Status: DISCONTINUED | OUTPATIENT
Start: 2018-04-03 | End: 2018-04-04 | Stop reason: HOSPADM

## 2018-04-03 RX ORDER — AMLODIPINE BESYLATE 5 MG/1
5 TABLET ORAL DAILY
COMMUNITY

## 2018-04-03 RX ORDER — HYDROCODONE BITARTRATE AND ACETAMINOPHEN 5; 325 MG/1; MG/1
1 TABLET ORAL EVERY 4 HOURS PRN
Status: DISCONTINUED | OUTPATIENT
Start: 2018-04-03 | End: 2018-04-04 | Stop reason: HOSPADM

## 2018-04-03 RX ORDER — SENNA AND DOCUSATE SODIUM 50; 8.6 MG/1; MG/1
2 TABLET, FILM COATED ORAL 2 TIMES DAILY PRN
Status: DISCONTINUED | OUTPATIENT
Start: 2018-04-03 | End: 2018-04-04 | Stop reason: HOSPADM

## 2018-04-03 RX ORDER — FLUOXETINE HYDROCHLORIDE 20 MG/1
40 CAPSULE ORAL DAILY
COMMUNITY

## 2018-04-03 RX ORDER — LANOLIN ALCOHOL/MO/W.PET/CERES
1000 CREAM (GRAM) TOPICAL DAILY
COMMUNITY
End: 2020-10-01

## 2018-04-03 RX ORDER — POTASSIUM CHLORIDE 1.5 G/1.77G
40 POWDER, FOR SOLUTION ORAL AS NEEDED
Status: DISCONTINUED | OUTPATIENT
Start: 2018-04-03 | End: 2018-04-04 | Stop reason: HOSPADM

## 2018-04-03 RX ORDER — POTASSIUM CHLORIDE 750 MG/1
40 CAPSULE, EXTENDED RELEASE ORAL AS NEEDED
Status: DISCONTINUED | OUTPATIENT
Start: 2018-04-03 | End: 2018-04-04 | Stop reason: HOSPADM

## 2018-04-03 RX ORDER — NICOTINE POLACRILEX 4 MG
15 LOZENGE BUCCAL
Status: DISCONTINUED | OUTPATIENT
Start: 2018-04-03 | End: 2018-04-04 | Stop reason: HOSPADM

## 2018-04-03 RX ORDER — MULTIVITAMIN/IRON/FOLIC ACID 18MG-0.4MG
1 TABLET ORAL DAILY
COMMUNITY

## 2018-04-03 RX ORDER — METOPROLOL SUCCINATE 25 MG/1
25 TABLET, EXTENDED RELEASE ORAL DAILY
Status: DISCONTINUED | OUTPATIENT
Start: 2018-04-03 | End: 2018-04-04 | Stop reason: HOSPADM

## 2018-04-03 RX ORDER — SODIUM CHLORIDE 0.9 % (FLUSH) 0.9 %
1-10 SYRINGE (ML) INJECTION AS NEEDED
Status: DISCONTINUED | OUTPATIENT
Start: 2018-04-03 | End: 2018-04-04 | Stop reason: HOSPADM

## 2018-04-03 RX ORDER — CEFTRIAXONE SODIUM 1 G/50ML
1 INJECTION, SOLUTION INTRAVENOUS EVERY 24 HOURS
Status: DISCONTINUED | OUTPATIENT
Start: 2018-04-03 | End: 2018-04-04 | Stop reason: HOSPADM

## 2018-04-03 RX ORDER — NITROGLYCERIN 0.4 MG/1
0.4 TABLET SUBLINGUAL
Status: DISCONTINUED | OUTPATIENT
Start: 2018-04-03 | End: 2018-04-04 | Stop reason: HOSPADM

## 2018-04-03 RX ORDER — AMLODIPINE BESYLATE 5 MG/1
5 TABLET ORAL DAILY
Status: DISCONTINUED | OUTPATIENT
Start: 2018-04-03 | End: 2018-04-04 | Stop reason: HOSPADM

## 2018-04-03 RX ORDER — ONDANSETRON 4 MG/1
4 TABLET, FILM COATED ORAL EVERY 6 HOURS PRN
Status: DISCONTINUED | OUTPATIENT
Start: 2018-04-03 | End: 2018-04-04 | Stop reason: HOSPADM

## 2018-04-03 RX ORDER — LEVOTHYROXINE SODIUM 0.05 MG/1
50 TABLET ORAL
Status: DISCONTINUED | OUTPATIENT
Start: 2018-04-04 | End: 2018-04-04 | Stop reason: HOSPADM

## 2018-04-03 RX ORDER — IBUPROFEN 400 MG/1
400 TABLET ORAL EVERY 6 HOURS PRN
COMMUNITY
End: 2020-10-01

## 2018-04-03 RX ORDER — ATORVASTATIN CALCIUM 10 MG/1
10 TABLET, FILM COATED ORAL DAILY
Status: DISCONTINUED | OUTPATIENT
Start: 2018-04-03 | End: 2018-04-04 | Stop reason: HOSPADM

## 2018-04-03 RX ORDER — CHOLECALCIFEROL (VITAMIN D3) 125 MCG
1000 CAPSULE ORAL DAILY
Status: DISCONTINUED | OUTPATIENT
Start: 2018-04-03 | End: 2018-04-04 | Stop reason: HOSPADM

## 2018-04-03 RX ADMIN — HYDROCODONE BITARTRATE AND ACETAMINOPHEN 1 TABLET: 5; 325 TABLET ORAL at 23:19

## 2018-04-03 RX ADMIN — METOPROLOL SUCCINATE 25 MG: 25 TABLET, FILM COATED, EXTENDED RELEASE ORAL at 17:36

## 2018-04-03 RX ADMIN — ENOXAPARIN SODIUM 40 MG: 40 INJECTION SUBCUTANEOUS at 21:24

## 2018-04-03 RX ADMIN — METFORMIN HYDROCHLORIDE: 500 TABLET, EXTENDED RELEASE ORAL at 17:33

## 2018-04-03 RX ADMIN — AMLODIPINE BESYLATE 5 MG: 5 TABLET ORAL at 17:36

## 2018-04-03 RX ADMIN — SODIUM CHLORIDE 125 ML/HR: 9 INJECTION, SOLUTION INTRAVENOUS at 16:21

## 2018-04-03 RX ADMIN — ATORVASTATIN CALCIUM 10 MG: 10 TABLET, FILM COATED ORAL at 16:21

## 2018-04-03 RX ADMIN — Medication 1000 MCG: at 17:33

## 2018-04-03 RX ADMIN — CEFTRIAXONE SODIUM 1 G: 1 INJECTION, SOLUTION INTRAVENOUS at 16:21

## 2018-04-03 RX ADMIN — SODIUM CHLORIDE 125 ML/HR: 9 INJECTION, SOLUTION INTRAVENOUS at 23:23

## 2018-04-03 RX ADMIN — INSULIN ASPART 5 UNITS: 100 INJECTION, SOLUTION INTRAVENOUS; SUBCUTANEOUS at 21:24

## 2018-04-03 RX ADMIN — ACETAMINOPHEN 650 MG: 325 TABLET ORAL at 21:24

## 2018-04-03 RX ADMIN — METOPROLOL TARTRATE 25 MG: 25 TABLET ORAL at 21:22

## 2018-04-03 NOTE — H&P
Name: Vanessa Dorsey ADMIT: 4/3/2018   : 1950  PCP: DANY Harley MD    MRN: [id number] LOS: 0 days   AGE/SEX: 68 y.o. female  ROOM: [facility]     No chief complaint on file.  urinary changes/general malaise/elevated BG    Subjective   Ms. Dorsey is a 68 y.o. female with a history of DM2 who presents to [facility] directly admitted after clinic visit with elevated BG and ketonuria. She reports that she has had viral prodrome recently with sore throat and muscle aches, fevers, chills, general malaise, and nonproductive cough. She also had noticed decreased urine which has become bad smelling and dark. She has not been taking insulin and is on Janumet as prescribed by her endocrinologist at [place]. Last A1c was 6.6 and she was doing well until few days ago. She then started havein BG into 200 and 300s. Most recently was in 250-260s. He has had decreased PO intake with her illness and malaise. She has had confusion and disorientation at times. No NVD or abdominal pain. No chest pain or palpiations.    History of Present Illness    Past Medical History:   Diagnosis Date   • Diabetes mellitus    • Disease of thyroid gland    • Hyperlipidemia    • Hypertension      Past Surgical History:   Procedure Laterality Date   • HYSTERECTOMY     • TUBAL ABDOMINAL LIGATION       Family History   Problem Relation Age of Onset   • Diabetes Mother    • COPD Mother    • Diabetes Father    • COPD Sister      Social History   Substance Use Topics   • Smoking status: Former Smoker     Packs/day: 1.50     Years: 40.00     Types: Cigarettes   • Smokeless tobacco: Never Used      Comment: quit n15 years ago   • Alcohol use No      Comment: 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      • amLODIPine-benazepril (LOTREL 5-10) 5-10 MG per  capsule Take 1 capsule by mouth.   11/29/2017 at Unknown time   • insulin detemir (LEVEMIR) 100 UNIT/ML injection Inject 15 Units under the skin Every Night. Dispense needles and supplies for levemir pen 3 pen 1    • levothyroxine (SYNTHROID, LEVOTHROID) 100 MCG tablet Take 50 mcg by mouth.   11/29/2017 at Unknown time   • lovastatin (MEVACOR) 40 MG tablet Take 40 mg by mouth.   11/29/2017 at Unknown time   • meloxicam (MOBIC) 15 MG tablet Take 15 mg by mouth Daily.   11/29/2017 at Unknown time   • metFORMIN (GLUCOPHAGE) 1000 MG tablet Take 1 tablet by mouth 2 (Two) Times a Day With Meals. 60 tablet 0    • metoprolol tartrate (LOPRESSOR) 25 MG tablet Take  by mouth.   11/29/2017 at Unknown time     Allergies:    Allergies   Allergen Reactions   • Ciprofloxacin        Review of Systems   Constitutional: Positive for fatigue and fever. Negative for chills.   HENT: Positive for sore throat. Negative for trouble swallowing.    Eyes: Negative for pain and visual disturbance.   Respiratory: Positive for cough. Negative for shortness of breath and wheezing.    Cardiovascular: Negative for chest pain, palpitations and leg swelling.   Gastrointestinal: Negative for abdominal pain, constipation, diarrhea, nausea and vomiting.   Endocrine: Negative for cold intolerance and heat intolerance.   Genitourinary: Positive for urgency. Negative for difficulty urinating, dysuria, flank pain and frequency.   Musculoskeletal: Negative for neck pain and neck stiffness.   Skin: Negative for pallor and rash.   Allergic/Immunologic: Negative for environmental allergies and food allergies.   Neurological: Negative for seizures and syncope.   Hematological: Negative for adenopathy.   Psychiatric/Behavioral: Positive for confusion. Negative for agitation.        Objective    Vital Signs        on   ;      There is no height or weight on file to calculate BMI.    Physical Exam   Constitutional: She is oriented to person, place, and time. She  appears well-developed. No distress.   HENT:   Head: Normocephalic and atraumatic.   Nose: Nose normal.   Mouth/Throat: No oropharyngeal exudate.   Eyes: Conjunctivae and EOM are normal. Pupils are equal, round, and reactive to light. No scleral icterus.   Neck: Normal range of motion. Neck supple.   Cardiovascular: Normal rate, regular rhythm and intact distal pulses.    No murmur heard.  Pulmonary/Chest: Effort normal and breath sounds normal. No stridor. She has no wheezes. She has no rales.   Abdominal: Soft. Bowel sounds are normal. She exhibits no distension. There is no tenderness.   Musculoskeletal: Normal range of motion. She exhibits no edema.   Neurological: She is alert and oriented to person, place, and time. No cranial nerve deficit. She exhibits normal muscle tone.   Skin: Skin is warm and dry. She is not diaphoretic.   Psychiatric: She has a normal mood and affect. Her behavior is normal.   Nursing note and vitals reviewed.      Results Review:   I reviewed the patient's new clinical results. Reviewed medical records.    Lab Results (last 24 hours)     ** No results found for the last 24 hours. **          XR Chest PA & Lateral    (Results Pending)     Assessment/Plan      Active Hospital Problems (** Indicates Principal Problem)    Diagnosis Date Noted   • **UTI (urinary tract infection) [N39.0] 04/03/2018   • Dehydration [E86.0] 04/03/2018   • Viral illness [B34.9] 04/03/2018   • Hypothyroidism [E03.9] 11/30/2017   • Type 2 diabetes mellitus with hyperglycemia [E11.65] 11/29/2017      Resolved Hospital Problems    Diagnosis Date Noted Date Resolved   No resolved problems to display.     - UTI: Altered urine quality and urgency reported. Will check UA.  - Viral Prodrome: Check CXR, EKG, and RVP/GAS swabs.  - DM2: Obtaining labs to incvestigate possible DKA with ABG, CMP, BHB. Will give IVF and sliding scale insulin. Serial BMP and replace potassium as needed.  - Dehydration: IVF.  - Hypothyroidism:  Synthroid once dose confirmed.  - PPx Lovenox  - Full Code    Further management as results reviewed and condition progresses today.    I discussed the patients findings and my recommendations with patient and primary care team.    - Reviewed EKG, sinus rhythm, no acute st changes  - Reviewed imaging, agree with interpretation. Will give Rocephin for empiric UTI coverage pending repeat UA and Cx results.    Coleman Us MD  [facility]  04/03/18  12:56 PM

## 2018-04-03 NOTE — PLAN OF CARE
Problem: Patient Care Overview  Goal: Plan of Care Review  Outcome: Ongoing (interventions implemented as appropriate)   04/03/18 1543   OTHER   Outcome Summary resp viral panel and rapid strep sent. vss. need urinalysis. vss every 4 hours. pt alert and oriented x 4's. accucheck ac/hs     Goal: Individualization and Mutuality  Outcome: Ongoing (interventions implemented as appropriate)    Goal: Discharge Needs Assessment  Outcome: Ongoing (interventions implemented as appropriate)    Goal: Interprofessional Rounds/Family Conf  Outcome: Ongoing (interventions implemented as appropriate)      Problem: Urinary Tract Infection (Adult)  Goal: Signs and Symptoms of Listed Potential Problems Will be Absent, Minimized or Managed (Urinary Tract Infection)  Outcome: Ongoing (interventions implemented as appropriate)

## 2018-04-03 NOTE — CONSULTS
"69 y/o  mother of 2 admitted to the hospital due to increasing weakness, elevated BG and ketonuria.  She states that she was diagnosed w/ diabetes several months ago and since that time has had a virus and has not felt well. She reports that she is now feeling better since getting to the hospital. She was sent for a direct admission by her endocrinologist office today.  Patient reported hx of wishing she would die. She states she has had chronic SI w/ method or intent for many many years.     Patient reports a hx of being on Prozac for years.  Per RN is was not listed on her home medications.  The Prozac was recently increased to 40 mg from 20.  She denies any hx of IP psych or KAUR treatment. She reports that 18 months ago she stopped daily ETOH consumption of 1 liter/day of wine. She denies hx of black outs, hx of DT's. She had a DUI years ago.  Patient reports past use of Marijuana monthly when provided by someone. She tried cocaine and free based it at one time.  No Cocaine use in many years.  At 27 use used benzos occasionally but none regularly.  No use regularly.  No hx of IP psych or KAUR tx.  She denies any hx of Suicide Attempts. She has a sponsor and attends AA. She has not been able to attend for several months. She has no current mental health provider. She has seen a counselor in the past.    Patient recently moved into a [employer] that she purchased. She works at [employer].  She has a son that she states is a \"anti social person.\"  Her other son lives in [place] and is a preacher.      Patient is alert and O x 4.  No SI or HI.  No a/v hallucinations. Speech is appropriate. Insight and judgment is appropriate.  She reports she is feeling better She denies being depressed at this time or anxious.  She is feeling better now that she is in the hospital.  [facility] will follow.    "

## 2018-04-04 VITALS
SYSTOLIC BLOOD PRESSURE: 129 MMHG | BODY MASS INDEX: 22.49 KG/M2 | OXYGEN SATURATION: 96 % | TEMPERATURE: 97.5 F | RESPIRATION RATE: 16 BRPM | HEART RATE: 93 BPM | DIASTOLIC BLOOD PRESSURE: 78 MMHG | WEIGHT: 135 LBS | HEIGHT: 65 IN

## 2018-04-04 LAB
ANION GAP SERPL CALCULATED.3IONS-SCNC: 12.5 MMOL/L
BUN BLD-MCNC: 12 MG/DL (ref 8–23)
BUN/CREAT SERPL: 19.7 (ref 7–25)
CALCIUM SPEC-SCNC: 8.6 MG/DL (ref 8.6–10.5)
CHLORIDE SERPL-SCNC: 97 MMOL/L (ref 98–107)
CO2 SERPL-SCNC: 24.5 MMOL/L (ref 22–29)
CREAT BLD-MCNC: 0.61 MG/DL (ref 0.57–1)
DEPRECATED RDW RBC AUTO: 43.7 FL (ref 37–54)
ERYTHROCYTE [DISTWIDTH] IN BLOOD BY AUTOMATED COUNT: 14.7 % (ref 11.7–13)
GFR SERPL CREATININE-BSD FRML MDRD: 98 ML/MIN/1.73
GLUCOSE BLD-MCNC: 136 MG/DL (ref 65–99)
GLUCOSE BLDC GLUCOMTR-MCNC: 127 MG/DL (ref 70–130)
GLUCOSE BLDC GLUCOMTR-MCNC: 191 MG/DL (ref 70–130)
HBA1C MFR BLD: 6.9 % (ref 4.8–5.6)
HCT VFR BLD AUTO: 29.6 % (ref 35.6–45.5)
HGB BLD-MCNC: 10.1 G/DL (ref 11.9–15.5)
MAGNESIUM SERPL-MCNC: 1.3 MG/DL (ref 1.6–2.4)
MCH RBC QN AUTO: 27.9 PG (ref 26.9–32)
MCHC RBC AUTO-ENTMCNC: 34.1 G/DL (ref 32.4–36.3)
MCV RBC AUTO: 81.8 FL (ref 80.5–98.2)
PLATELET # BLD AUTO: 352 10*3/MM3 (ref 140–500)
PMV BLD AUTO: 10 FL (ref 6–12)
POTASSIUM BLD-SCNC: 3.8 MMOL/L (ref 3.5–5.2)
RBC # BLD AUTO: 3.62 10*6/MM3 (ref 3.9–5.2)
SODIUM BLD-SCNC: 134 MMOL/L (ref 136–145)
WBC NRBC COR # BLD: 8.66 10*3/MM3 (ref 4.5–10.7)

## 2018-04-04 PROCEDURE — 83735 ASSAY OF MAGNESIUM: CPT | Performed by: INTERNAL MEDICINE

## 2018-04-04 PROCEDURE — 85027 COMPLETE CBC AUTOMATED: CPT | Performed by: INTERNAL MEDICINE

## 2018-04-04 PROCEDURE — 83036 HEMOGLOBIN GLYCOSYLATED A1C: CPT | Performed by: INTERNAL MEDICINE

## 2018-04-04 PROCEDURE — G0378 HOSPITAL OBSERVATION PER HR: HCPCS

## 2018-04-04 PROCEDURE — 96361 HYDRATE IV INFUSION ADD-ON: CPT

## 2018-04-04 PROCEDURE — 82962 GLUCOSE BLOOD TEST: CPT

## 2018-04-04 PROCEDURE — 96365 THER/PROPH/DIAG IV INF INIT: CPT

## 2018-04-04 PROCEDURE — 96366 THER/PROPH/DIAG IV INF ADDON: CPT

## 2018-04-04 PROCEDURE — 25010000002 MAGNESIUM SULFATE IN D5W 1G/100ML (PREMIX) 1-5 GM/100ML-% SOLUTION: Performed by: HOSPITALIST

## 2018-04-04 PROCEDURE — 80048 BASIC METABOLIC PNL TOTAL CA: CPT | Performed by: INTERNAL MEDICINE

## 2018-04-04 RX ORDER — SULFAMETHOXAZOLE AND TRIMETHOPRIM 800; 160 MG/1; MG/1
1 TABLET ORAL 2 TIMES DAILY
Qty: 6 TABLET | Refills: 0 | Status: SHIPPED | OUTPATIENT
Start: 2018-04-04 | End: 2018-04-07

## 2018-04-04 RX ORDER — MAGNESIUM SULFATE HEPTAHYDRATE 40 MG/ML
2 INJECTION, SOLUTION INTRAVENOUS AS NEEDED
Status: DISCONTINUED | OUTPATIENT
Start: 2018-04-04 | End: 2018-04-04 | Stop reason: HOSPADM

## 2018-04-04 RX ORDER — ADHESIVE TAPE 3"X 2.3 YD
200 TAPE, NON-MEDICATED TOPICAL DAILY
Qty: 30 TABLET | Refills: 0 | Status: SHIPPED | OUTPATIENT
Start: 2018-04-04 | End: 2020-10-01

## 2018-04-04 RX ORDER — MAGNESIUM SULFATE 1 G/100ML
1 INJECTION INTRAVENOUS
Status: COMPLETED | OUTPATIENT
Start: 2018-04-04 | End: 2018-04-04

## 2018-04-04 RX ORDER — MAGNESIUM SULFATE HEPTAHYDRATE 40 MG/ML
4 INJECTION, SOLUTION INTRAVENOUS AS NEEDED
Status: DISCONTINUED | OUTPATIENT
Start: 2018-04-04 | End: 2018-04-04 | Stop reason: HOSPADM

## 2018-04-04 RX ADMIN — ATORVASTATIN CALCIUM 10 MG: 10 TABLET, FILM COATED ORAL at 10:27

## 2018-04-04 RX ADMIN — AMLODIPINE BESYLATE 5 MG: 5 TABLET ORAL at 10:27

## 2018-04-04 RX ADMIN — SODIUM CHLORIDE 125 ML/HR: 9 INJECTION, SOLUTION INTRAVENOUS at 06:55

## 2018-04-04 RX ADMIN — MAGNESIUM SULFATE HEPTAHYDRATE 1 G: 1 INJECTION, SOLUTION INTRAVENOUS at 10:29

## 2018-04-04 RX ADMIN — LEVOTHYROXINE SODIUM 50 MCG: 50 TABLET ORAL at 06:52

## 2018-04-04 RX ADMIN — METFORMIN HYDROCHLORIDE: 500 TABLET, EXTENDED RELEASE ORAL at 11:54

## 2018-04-04 RX ADMIN — Medication 1000 MCG: at 10:26

## 2018-04-04 RX ADMIN — METOPROLOL SUCCINATE 25 MG: 25 TABLET, FILM COATED, EXTENDED RELEASE ORAL at 10:25

## 2018-04-04 RX ADMIN — MAGNESIUM SULFATE HEPTAHYDRATE 1 G: 1 INJECTION, SOLUTION INTRAVENOUS at 07:00

## 2018-04-04 NOTE — PROGRESS NOTES
Case Management Discharge Note    Final Note: Home no needs    Destination     No service coordination in this encounter.      Durable Medical Equipment     No service coordination in this encounter.      Dialysis/Infusion     No service coordination in this encounter.      Home Medical Care     No service coordination in this encounter.      Social Care     No service coordination in this encounter.        Other:  (car)    Final Discharge Disposition Code: 01 - home or self-care

## 2018-04-04 NOTE — PROGRESS NOTES
Met with patient.  She is bright and engaging.  She continues to voice improvement in mood and energy. She reports medical tx has helped a lot. She declines need for referrals for mental health treatment once she is discharged.  She states she has resources at home.  No SI or HI.  [facility] will follow.

## 2018-04-04 NOTE — CONSULTS
Adult Nutrition  Assessment/PES    Patient Name:  Vanessa Dorsey  YOB: 1950  MRN: [id number]  Admit Date:  4/3/2018    Assessment Date:  4/4/2018    Comments:  Completed nutrition assessment triggered by nurse admission screen.          Adult Nutrition Assessment     Row Name 04/04/18 1046       Nutrition Prescription PO    Current PO Diet Regular    Common Modifiers Consistent Carbohydrate    Row Name 04/04/18 1045       Calculation Measurements    Weight Used For Calculations 61.2 kg (134 lb 14.7 oz)       Estimated/Assessed Needs    Additional Documentation Calorie Requirements (Group);Fluid Requirements (Group);Protein Requirements (Group)       Calorie Requirements    Estimated Calorie Requirement (kcal/day) 1530    Estimated Calorie Need Method kcal/kg    Estimated Calorie Requirement Comment 25 sheryl/kg       KCAL/KG    14 Kcal/Kg (kcal) 856.8    15 Kcal/Kg (kcal) 918    18 Kcal/Kg (kcal) 1101.6    20 Kcal/Kg (kcal) 1224    25 Kcal/Kg (kcal) 1530    30 Kcal/Kg (kcal) 1836    35 Kcal/Kg (kcal) 2142    40 Kcal/Kg (kcal) 2448    45 Kcal/Kg (kcal) 2754    50 Kcal/Kg (kcal) 3060       [place]-St. Jeor Equation    RMR ([place]-St. Jeor Equation) 1152.88       Protein Requirements    Est Protein Requirement Amount (gms/kg) 1.0 gm protein    Estimated Protein Requirements (gms/day) 61.2       Fluid Requirements    Estimated Fluid Requirements (mL/day) 1530    Estimated Fluid Requirement Method RDA Method    RDA Method (mL) 1530    Kurtis-Segar Method (over 20 kg) 2724       Nutrient/Fluid Evaluation    Number of Days Evaluated 1 day    Additional Documentation Fluid Intake Evaluation (Group)       Intake Assessment    Fluid Requirement Assessment exceeds needs       Fluid Intake Evaluation    IV Fluid (mL) 3000       PO Evaluation    Number of Days PO Intake Evaluated 1 day    Number of Meals 1    % PO Intake 100    Row Name 04/04/18 1044       Physical Findings    Skin other (see comments)    intact, Reinaldo score 21    Row Name 04/04/18 1043       Labs/Procedures/Meds    Lab Results Reviewed reviewed    Lab Results Comments glu, HgbA1c high; Mg++, Na+, H/H low; vitals reviewed; chest xray 4/3; meds: antibiotic, diabetic oral agent, insulin    Row Name 04/04/18 1042       Admit Weight    Admit Weight Method stated    Admit Weight 61.2 kg (134 lb 14.7 oz)       Usual Body Weight (UBW)    Weight Loss Time Frame Weight November, 2017 151#. Current weight 135# (stated). 16# weight loss (11.85%) x 5 months       Body Mass Index (BMI)    BMI Assessment BMI 18.5-24.9: normal    Row Name 04/04/18 1040       Reason for Assessment    Reason For Assessment nurse/nurse practitioner consult    Diagnosis infection/sepsis    Identified At Risk by Screening Criteria MST SCORE 2+;unintentional loss of 10 lbs or more in the past 2 mos          Problem/Interventions:        Problem 1     Row Name 04/04/18 1047       Nutrition Diagnoses Problem 1    Problem 1 Nutrition Appropriate for Condition at this Time    Etiology (related to) Medical Diagnosis    Endocrine DM2    Signs/Symptoms (evidenced by) Report/Observation    Reported/Observed By MD                    Intervention Goal     Row Name 04/04/18 1047       Intervention Goal    General Maintain nutrition    PO Tolerate PO;Maintain intake;PO intake (%)    PO Intake % 75 %    Weight No significant weight loss            Nutrition Intervention     Row Name 04/04/18 1047       Nutrition Intervention    RD/Tech Action Follow Tx progress;Care plan reviewd            Nutrition Prescription     Row Name 04/04/18 1047       Other Orders    Obtain Weight Now    Obtain Weight Ordered? No, recommended            Education/Evaluation     Row Name 04/04/18 1047       Education    Education Will Instruct as appropriate       Monitor/Evaluation    Monitor Per protocol        Electronically signed by:  Lakisha Alvarado RD  04/04/18 10:49 AM

## 2018-04-04 NOTE — PROGRESS NOTES
Discharge Planning Assessment  [facility]     Patient Name: Vanessa Dorsey  MRN: [id number]  Today's Date: 4/4/2018    Admit Date: 4/3/2018          Discharge Needs Assessment     Row Name 04/04/18 0955       Living Environment    Lives With alone    Current Living Arrangements home/apartment/condo    Potentially Unsafe Housing Conditions unable to assess    Primary Care Provided by self    Provides Primary Care For no one    Family Caregiver if Needed sibling(s)    Quality of Family Relationships helpful;involved;supportive    Able to Return to Prior Arrangements yes       Resource/Environmental Concerns    Resource/Environmental Concerns none    Transportation Concerns car, none       Transition Planning    Patient/Family Anticipates Transition to home    Patient/Family Anticipated Services at Transition none    Transportation Anticipated family or friend will provide       Discharge Needs Assessment    Readmission Within the Last 30 Days no previous admission in last 30 days    Concerns to be Addressed no discharge needs identified;denies needs/concerns at this time    Equipment Currently Used at Home none    Anticipated Changes Related to Illness none    Equipment Needed After Discharge none            Discharge Plan     Row Name 04/04/18 0956       Plan    Plan Home; follow for IV abx     Patient/Family in Agreement with Plan yes    Plan Comments CCP met with patient at bedside. CCP role explained and discharge planning discussed. Face sheet verified and IMM noted. Patient does have POA/living will; Karey Venancio ([phone]) not on file. Patient's PCP is Dr. Harley. Patient lives alone and has no steps to the entrance or within the home. Patient does have grab bars present in her bathroom. Patient does not use any medical equipment and is independent with her ADLs. Patient has not used home health or been to SNF. Patient uses [facility] on [address]; patient denies having trouble affording her  medications and does not have trouble remembering her medications. Patient has transportation home. Patient states her sister can assist as needed. CCP will follow for IV abx and assist as needed. HH/SNF list left at bedside. Renée Laureano CSW          Destination     No service coordination in this encounter.      Durable Medical Equipment     No service coordination in this encounter.      Dialysis/Infusion     No service coordination in this encounter.      Home Medical Care     No service coordination in this encounter.      Social Care     No service coordination in this encounter.                Demographic Summary     Row Name 04/04/18 0953       General Information    Admission Type inpatient    Arrived From home    Required Notices Provided Important Message from Medicare    Referral Source admission list    Reason for Consult discharge planning    Preferred Language English     Used During This Interaction no       Contact Information    Permission Granted to Share Info With permission denied            Functional Status     Row Name 04/04/18 0954       Functional Status    Usual Activity Tolerance good    Current Activity Tolerance good       Functional Status, IADL    Medications independent    Meal Preparation independent    Housekeeping independent    Laundry independent    Shopping independent       Mental Status    General Appearance WDL WDL       Mental Status Summary    Recent Changes in Mental Status/Cognitive Functioning no changes            Psychosocial    No documentation.           Abuse/Neglect    No documentation.           Legal    No documentation.           Substance Abuse    No documentation.           Patient Forms    No documentation.         Darlin Laureano, VADIM

## 2018-04-04 NOTE — PLAN OF CARE
Problem: Urinary Tract Infection (Adult)  Goal: Signs and Symptoms of Listed Potential Problems Will be Absent, Minimized or Managed (Urinary Tract Infection)  Outcome: Ongoing (interventions implemented as appropriate)   04/04/18 4585   Goal/Outcome Evaluation   Problems Assessed (Urinary Tract Infection) all

## 2018-04-04 NOTE — DISCHARGE SUMMARY
Date of Admission: 4/3/2018  Date of Discharge:  4/4/2018  Primary Care Physician: DANY Harley MD     Discharge Diagnosis:  Active Hospital Problems (** Indicates Principal Problem)    Diagnosis Date Noted   • **UTI (urinary tract infection) [N39.0] 04/03/2018   • Dehydration [E86.0] 04/03/2018   • Viral illness [B34.9] 04/03/2018   • Hypothyroidism [E03.9] 11/30/2017   • Type 2 diabetes mellitus with hyperglycemia [E11.65] 11/29/2017      Resolved Hospital Problems    Diagnosis Date Noted Date Resolved   No resolved problems to display.       Presenting Problem/History of Present Illness:  UTI (urinary tract infection) [N39.0]  UTI (urinary tract infection) [N39.0]     Ms. Dorsey is a 68 y.o. female with a history of DM2 who presents to [facility] directly admitted after clinic visit with elevated BG and ketonuria. She reports that she has had viral prodrome recently with sore throat and muscle aches, fevers, chills, general malaise, and nonproductive cough. She also had noticed decreased urine which has become bad smelling and dark. She has not been taking insulin and is on Janumet as prescribed by her endocrinologist at [place]. Last A1c was 6.6 and she was doing well until few days ago. She then started havein BG into 200 and 300s. Most recently was in 250-260s. He has had decreased PO intake with her illness and malaise. She has had confusion and disorientation at times. No NVD or abdominal pain. No chest pain or palpiations.    Hospital Course:  The patient is a 68 y.o. female who presented with UTI, viral prodrome, and DM2 with hyperglycemia. She was directly admitted after PCP visit where UTI diagnosed and had ketones in urine concerning for DKA. She was admitted, DKA workup performed and started on empiric rocephin for UTI and IVF. She did not have DKA with no acidosis on ABG and normal BHB. Our urinalysis here was negative for ketones as well but did confirm pyuria and positive leukocyte  esterase. EKG was normal and CXR did not show acute infiltrate. Due to sore throat and cough RVP and Rapid strep test were obtained and were negative. She did well overnight, remained afebrile, and WNC was normal. Her A1c was 6.9 so she will be resume on home regimen and she only require 5 units of sliding scale insulin. Her magnesium level was low at 1.3. IV replacement was given and she will be prescribed PO mag-ox for continued replacement. Urine culture is pending at this time. Bactrim DS will be given to complete antibiotic course. She can follow up with her primary care and endocrinologist as scheduled.    Exam Today:  Constitutional: She appears well-developed. No distress.   HENT:   Head: Normocephalic and atraumatic.   Nose: Nose normal.   Mouth/Throat: No oropharyngeal exudate.   Eyes: Conjunctivae and EOM are normal.  Neck: Normal range of motion. Neck supple.   Cardiovascular: Normal rate, regular rhythm and intact distal pulses. No murmur heard.  Pulmonary/Chest: Effort normal and breath sounds normal. She has no wheezes.   Abdominal: Soft. She exhibits no distension.   Musculoskeletal: Normal range of motion. She exhibits no edema.   Neurological: She is alert and oriented to person, place, and time.   Skin: Skin is warm and dry. She is not diaphoretic.   Psychiatric: She has a normal mood and affect. Her behavior is normal.     Results:  CXR  The lungs are moderately well-expanded and clear except for a  calcified granuloma at the right base. The heart size is normal and  there is no change from 11/29/2017.    Procedures Performed:         Consults:   Consults     No orders found for last 30 day(s).           Discharge Disposition:  Home or Self Care    Discharge Medications:   Vanessa Dorsey   Home Medication Instructions JOSE:[id number]    Printed on:04/04/18 1203   Medication Information                      amLODIPine (NORVASC) 5 MG tablet  Take 5 mg by mouth Daily.             FLUoxetine  (PROzac) 20 MG capsule  Take 40 mg by mouth Daily.             ibuprofen (ADVIL,MOTRIN) 400 MG tablet  Take 400 mg by mouth Every 6 (Six) Hours As Needed for Mild Pain .             levothyroxine (SYNTHROID, LEVOTHROID) 100 MCG tablet  Take 50 mcg by mouth Daily.             lovastatin (MEVACOR) 40 MG tablet  Take 40 mg by mouth Every Night.             Magnesium Oxide 200 MG tablet  Take 1 tablet by mouth Daily.             meloxicam (MOBIC) 15 MG tablet  Take 15 mg by mouth Daily.             metoprolol succinate XL (TOPROL-XL) 25 MG 24 hr tablet  Take 25 mg by mouth Daily.             Multiple Vitamins-Minerals (CENTRUM ADULTS) tablet  Take 1 tablet by mouth Daily.             sitaGLIPtin-metFORMIN (JANUMET)  MG per tablet  Take 1 tablet by mouth 2 (Two) Times a Day With Meals.             sulfamethoxazole-trimethoprim (BACTRIM DS,SEPTRA DS) 800-160 MG per tablet  Take 1 tablet by mouth 2 (Two) Times a Day for 3 days.             vitamin B-12 (CYANOCOBALAMIN) 1000 MCG tablet  Take 1,000 mcg by mouth Daily.                 Discharge Diet:   Diet Instructions     Diet: Consistent Carbohydrate       Discharge Diet:  Consistent Carbohydrate          Activity at Discharge:   Activity Instructions     Activity as Tolerated       Discharge Activity Restrictions       Return to school / work on 04/09/18.          Follow-up Appointments:  No future appointments.  Additional Instructions for the Follow-ups that You Need to Schedule     Discharge Follow-up with PCP    As directed      Follow Up Details:  1-2 weeks         Discharge Follow-up with Specified Provider: Endocrinologist    As directed      To:  Endocrinologist    Follow Up Details:  as scheduled               Test Results Pending at Discharge:   Order Current Status    Beta Strep Culture, Throat - Swab, Throat Preliminary result    Urine Culture - Urine, Urine, Clean Catch Preliminary result           Coleman Us MD  04/04/18  12:03 PM    Time  Spent on Discharge Activities: >30 minutes

## 2018-04-05 LAB
BACTERIA SPEC AEROBE CULT: NORMAL

## 2018-04-11 ENCOUNTER — TRANSCRIBE ORDERS (OUTPATIENT)
Dept: ADMINISTRATIVE | Facility: HOSPITAL | Age: 68
End: 2018-04-11

## 2018-04-11 DIAGNOSIS — R79.89 ABNORMAL LFTS: Primary | ICD-10-CM

## 2018-04-14 ENCOUNTER — HOSPITAL ENCOUNTER (OUTPATIENT)
Dept: CT IMAGING | Facility: HOSPITAL | Age: 68
Discharge: HOME | End: 2018-04-14
Admitting: FAMILY MEDICINE

## 2018-04-14 DIAGNOSIS — R79.89 ABNORMAL LFTS: ICD-10-CM

## 2018-04-14 LAB — CREAT BLDA-MCNC: 0.7 MG/DL (ref 0.6–1.3)

## 2018-04-14 PROCEDURE — 25010000002 IOPAMIDOL 61 % SOLUTION: Performed by: FAMILY MEDICINE

## 2018-04-14 PROCEDURE — 74160 CT ABDOMEN W/CONTRAST: CPT

## 2018-04-14 PROCEDURE — 82565 ASSAY OF CREATININE: CPT

## 2018-04-14 RX ADMIN — IOPAMIDOL 85 ML: 612 INJECTION, SOLUTION INTRAVENOUS at 09:05

## 2020-09-29 ENCOUNTER — TRANSCRIBE ORDERS (OUTPATIENT)
Dept: PREADMISSION TESTING | Facility: HOSPITAL | Age: 70
End: 2020-09-29

## 2020-09-29 DIAGNOSIS — Z01.818 OTHER SPECIFIED PRE-OPERATIVE EXAMINATION: Primary | ICD-10-CM

## 2020-10-01 ENCOUNTER — APPOINTMENT (OUTPATIENT)
Dept: PREADMISSION TESTING | Facility: HOSPITAL | Age: 70
End: 2020-10-01

## 2020-10-01 ENCOUNTER — HOSPITAL ENCOUNTER (OUTPATIENT)
Dept: GENERAL RADIOLOGY | Facility: HOSPITAL | Age: 70
Discharge: HOME | End: 2020-10-01

## 2020-10-01 VITALS
HEIGHT: 65 IN | HEART RATE: 83 BPM | DIASTOLIC BLOOD PRESSURE: 71 MMHG | WEIGHT: 137 LBS | RESPIRATION RATE: 16 BRPM | TEMPERATURE: 98.1 F | SYSTOLIC BLOOD PRESSURE: 127 MMHG | BODY MASS INDEX: 22.82 KG/M2 | OXYGEN SATURATION: 99 %

## 2020-10-01 LAB
ALBUMIN SERPL-MCNC: 4.6 G/DL (ref 3.5–5.2)
ALBUMIN/GLOB SERPL: 1.7 G/DL
ALP SERPL-CCNC: 76 U/L (ref 39–117)
ALT SERPL W P-5'-P-CCNC: 19 U/L (ref 1–33)
ANION GAP SERPL CALCULATED.3IONS-SCNC: 11.1 MMOL/L (ref 5–15)
AST SERPL-CCNC: 20 U/L (ref 1–32)
BACTERIA UR QL AUTO: ABNORMAL /HPF
BILIRUB SERPL-MCNC: 0.3 MG/DL (ref 0–1.2)
BILIRUB UR QL STRIP: NEGATIVE
BUN SERPL-MCNC: 23 MG/DL (ref 8–23)
BUN/CREAT SERPL: 20.9 (ref 7–25)
CALCIUM SPEC-SCNC: 10 MG/DL (ref 8.6–10.5)
CHLORIDE SERPL-SCNC: 99 MMOL/L (ref 98–107)
CLARITY UR: CLEAR
CO2 SERPL-SCNC: 25.9 MMOL/L (ref 22–29)
COLOR UR: ABNORMAL
CREAT SERPL-MCNC: 1.1 MG/DL (ref 0.57–1)
DEPRECATED RDW RBC AUTO: 43.2 FL (ref 37–54)
ERYTHROCYTE [DISTWIDTH] IN BLOOD BY AUTOMATED COUNT: 13.6 % (ref 12.3–15.4)
GFR SERPL CREATININE-BSD FRML MDRD: 49 ML/MIN/1.73
GLOBULIN UR ELPH-MCNC: 2.7 GM/DL
GLUCOSE SERPL-MCNC: 95 MG/DL (ref 65–99)
GLUCOSE UR STRIP-MCNC: ABNORMAL MG/DL
HBA1C MFR BLD: 6.4 % (ref 4.8–5.6)
HCT VFR BLD AUTO: 35.9 % (ref 34–46.6)
HGB BLD-MCNC: 11.8 G/DL (ref 12–15.9)
HGB UR QL STRIP.AUTO: NEGATIVE
HYALINE CASTS UR QL AUTO: ABNORMAL /LPF
INR PPP: 1.02 (ref 0.9–1.1)
KETONES UR QL STRIP: ABNORMAL
LEUKOCYTE ESTERASE UR QL STRIP.AUTO: ABNORMAL
MCH RBC QN AUTO: 28.6 PG (ref 26.6–33)
MCHC RBC AUTO-ENTMCNC: 32.9 G/DL (ref 31.5–35.7)
MCV RBC AUTO: 87.1 FL (ref 79–97)
NITRITE UR QL STRIP: NEGATIVE
PH UR STRIP.AUTO: 5.5 [PH] (ref 5–8)
PLATELET # BLD AUTO: 340 10*3/MM3 (ref 140–450)
PMV BLD AUTO: 9.9 FL (ref 6–12)
POTASSIUM SERPL-SCNC: 4.1 MMOL/L (ref 3.5–5.2)
PROT SERPL-MCNC: 7.3 G/DL (ref 6–8.5)
PROT UR QL STRIP: NEGATIVE
PROTHROMBIN TIME: 13.3 SECONDS (ref 11.7–14.2)
RBC # BLD AUTO: 4.12 10*6/MM3 (ref 3.77–5.28)
RBC # UR: ABNORMAL /HPF
REF LAB TEST METHOD: ABNORMAL
SODIUM SERPL-SCNC: 136 MMOL/L (ref 136–145)
SP GR UR STRIP: 1.03 (ref 1–1.03)
SQUAMOUS #/AREA URNS HPF: ABNORMAL /HPF
UROBILINOGEN UR QL STRIP: ABNORMAL
WBC # BLD AUTO: 7.57 10*3/MM3 (ref 3.4–10.8)
WBC UR QL AUTO: ABNORMAL /HPF

## 2020-10-01 PROCEDURE — 80053 COMPREHEN METABOLIC PANEL: CPT | Performed by: ORTHOPAEDIC SURGERY

## 2020-10-01 PROCEDURE — 83036 HEMOGLOBIN GLYCOSYLATED A1C: CPT | Performed by: ORTHOPAEDIC SURGERY

## 2020-10-01 PROCEDURE — 36415 COLL VENOUS BLD VENIPUNCTURE: CPT

## 2020-10-01 PROCEDURE — 71046 X-RAY EXAM CHEST 2 VIEWS: CPT

## 2020-10-01 PROCEDURE — 81001 URINALYSIS AUTO W/SCOPE: CPT | Performed by: ORTHOPAEDIC SURGERY

## 2020-10-01 PROCEDURE — 85027 COMPLETE CBC AUTOMATED: CPT | Performed by: ORTHOPAEDIC SURGERY

## 2020-10-01 PROCEDURE — 73560 X-RAY EXAM OF KNEE 1 OR 2: CPT

## 2020-10-01 PROCEDURE — 93005 ELECTROCARDIOGRAM TRACING: CPT

## 2020-10-01 PROCEDURE — 85610 PROTHROMBIN TIME: CPT | Performed by: ORTHOPAEDIC SURGERY

## 2020-10-01 PROCEDURE — 93010 ELECTROCARDIOGRAM REPORT: CPT | Performed by: INTERNAL MEDICINE

## 2020-10-01 PROCEDURE — 63710000001 MUPIROCIN 2 % OINTMENT: Performed by: ORTHOPAEDIC SURGERY

## 2020-10-01 PROCEDURE — A9270 NON-COVERED ITEM OR SERVICE: HCPCS | Performed by: ORTHOPAEDIC SURGERY

## 2020-10-01 ASSESSMENT — KOOS JR
KOOS JR SCORE: 14
KOOS JR SCORE: 52.465

## 2020-10-01 NOTE — DISCHARGE INSTRUCTIONS
Take the following medications the morning of surgery:    AMLODIPINE, PROZAC, LEVOTHYROXINE, METOPROLOL    ARRIVE TO MAIN OR DESK 10-8-2020 AT 10:30AM      If you are on prescription narcotic pain medication to control your pain you may also take that medication the morning of surgery.    General Instructions:  • Do not eat solid food after midnight the night before surgery.  • You may drink clear liquids day of surgery but must stop at least one hour before your hospital arrival time.(9:30AM)  • It is beneficial for you to have a clear drink that contains carbohydrates the day of surgery.  We suggest a 12 to 20 ounce bottle of Gatorade or Powerade for non-diabetic patients or a 12 to 20 ounce bottle of G2 or Powerade Zero for diabetic patients. (Pediatric patients, are not advised to drink a 12 to 20 ounce carbohydrate drink)    Clear liquids are liquids you can see through.  Nothing red in color.     Plain water                               Sports drinks  Sodas                                   Gelatin (Jell-O)  Fruit juices without pulp such as white grape juice and apple juice  Popsicles that contain no fruit or yogurt  Tea or coffee (no cream or milk added)  Gatorade / Powerade  G2 / Powerade Zero    • Infants may have breast milk up to four hours before surgery.  • Infants drinking formula may drink formula up to six hours before surgery.   • Patients who avoid smoking, chewing tobacco and alcohol for 4 weeks prior to surgery have a reduced risk of post-operative complications.  Quit smoking as many days before surgery as you can.  • Do not smoke, use chewing tobacco or drink alcohol the day of surgery.   • If applicable bring your C-PAP/ BI-PAP machine.  • Bring any papers given to you in the doctor’s office.  • Wear clean comfortable clothes.  • Do not wear contact lenses, false eyelashes or make-up.  Bring a case for your glasses.   • Bring crutches or walker if applicable.  • Remove all piercings.  Leave  jewelry and any other valuables at home.  • Hair extensions with metal clips must be removed prior to surgery.  • The Pre-Admission Testing nurse will instruct you to bring medications if unable to obtain an accurate list in Pre-Admission Testing.        Preventing a Surgical Site Infection:  • For 2 to 3 days before surgery, avoid shaving with a razor because the razor can irritate skin and make it easier to develop an infection.    • Any areas of open skin can increase the risk of a post-operative wound infection by allowing bacteria to enter and travel throughout the body.  Notify your surgeon if you have any skin wounds / rashes even if it is not near the expected surgical site.  The area will need assessed to determine if surgery should be delayed until it is healed.  • The night prior to surgery shower using a fresh bar of anti-bacterial soap (such as Dial) and clean washcloth.  Sleep in a clean bed with clean clothing.  Do not allow pets to sleep with you.  • Shower on the morning of surgery using a fresh bar of anti-bacterial soap (such as Dial) and clean washcloth.  Dry with a clean towel and dress in clean clothing.  • Ask your surgeon if you will be receiving antibiotics prior to surgery.  • Make sure you, your family, and all healthcare providers clean their hands with soap and water or an alcohol based hand  before caring for you or your wound.    Day of surgery:  Your arrival time is approximately two hours before your scheduled surgery time.  Upon arrival, a Pre-op nurse and Anesthesiologist will review your health history, obtain vital signs, and answer questions you may have.  The only belongings needed at this time will be a list of your home medications and if applicable your C-PAP/BI-PAP machine.  If you are staying overnight your family can leave the rest of your belongings in the car and bring them to your room later.  A Pre-op nurse will start an IV and you may receive medication in  preparation for surgery, including something to help you relax.  Your family will be able to see you in the Pre-op area.  Two visitors at a time will be allowed in the Pre-op room.  While you are in surgery your family should notify the waiting room  if they leave the waiting room area and provide a contact phone number.    Please be aware that surgery does come with discomfort.  We want to make every effort to control your discomfort so please discuss any uncontrolled symptoms with your nurse.   Your doctor will most likely have prescribed pain medications.      If you are going home after surgery you will receive individualized written care instructions before being discharged.  A responsible adult must drive you to and from the hospital on the day of your surgery and stay with you for 24 hours.    If you are staying overnight following surgery, you will be transported to your hospital room following the recovery period.  [facility] has all private rooms.    If you have any questions please call Pre-Admission Testing at [phone].  Deductibles and co-payments are collected on the day of service. Please be prepared to pay the required co-pay, deductible or deposit on the day of service as defined by your plan.    Patient Education for Self-Quarantine Process    Following your COVID testing, we strongly recommend that you do not leave your home after you have been tested for COVID except to get medical care. This includes not going to work, school or to public areas.  If this is not possible for you to do please limit your activities to only required outings.  Be sure to wear a mask when you are with other people, practice social distancing and wash your hands frequently.      The following items provide additional details to keep you safe.  • Wash your hands with soap and water frequently for at least 20 seconds.   • Avoid touching your eyes, nose and mouth with unwashed hands.  • Do  not share anything - utensils, towels, food from the same bowl.   • Have your own utensils, drinking glass, dishes, towels and bedding.   • Do not have visitors.   • Do use FaceTime to stay in touch with family and friends.  • You should stay in a specific room away from others if possible.   • Stay at least 6 feet away from others in the home if you cannot have a dedicated room to yourself.   • Do not snuggle with your pet. While the CDC says there is no evidence that pets can spread COVID-19 or be infected from humans, it is probably best to avoid “petting, snuggling, being kissed or licked and sharing food (during self-quarantine)”, according to the CDC.   • Sanitize household surfaces daily. Include all high touch areas (door handles, light switches, phones, countertops, etc.)  • Do not share a bathroom with others, if possible.   • Wear a mask around others in your home if you are unable to stay in a separate room or 6 feet apart. If  you are unable to wear a mask, have your family member wear a mask if they must be within 6 feet of you.   Call your surgeon immediately if you experience any of the following symptoms:  • Sore Throat  • Shortness of Breath or difficulty breathing  • Cough  • Chills  • Body soreness or muscle pain  • Headache  • Fever  • New loss of taste or smell  • Do not arrive for your surgery ill.  Your procedure will need to be rescheduled to another time.  You will need to call your physician before the day of surgery to avoid any unnecessary exposure to hospital staff as well as other patients.        CHLORHEXIDINE CLOTH INSTRUCTIONS  The morning of surgery follow these instructions using the Chlorhexidine cloths you've been given.  These steps reduce bacteria on the body.  Do not use the cloths near your eyes, ears mouth, genitalia or on open wounds.  Throw the cloths away after use but do not try to flush them down a toilet.      • Open and remove one cloth at a time from the package.     • Leave the cloth unfolded and begin the bathing.  • Massage the skin with the cloths using gentle pressure to remove bacteria.  Do not scrub harshly.   • Follow the steps below with one 2% CHG cloth per area (6 total cloths).  • One cloth for neck, shoulders and chest.  • One cloth for both arms, hands, fingers and underarms (do underarms last).  • One cloth for the abdomen followed by groin.  • One cloth for right leg and foot including between the toes.  • One cloth for left leg and foot including between the toes.  • The last cloth is to be used for the back of the neck, back and buttocks.    Allow the CHG to air dry 3 minutes on the skin which will give it time to work and decrease the chance of irritation.  The skin may feel sticky until it is dry.  Do not rinse with water or any other liquid or you will lose the beneficial effects of the CHG.  If mild skin irritation occurs, do rinse the skin to remove the CHG.  Report this to the nurse at time of admission.  Do not apply lotions, creams, ointments, deodorants or perfumes after using the clothes. Dress in clean clothes before coming to the hospital.    BACTROBAN NASAL OINTMENT  There are many germs normally in your nose. Bactroban is an ointment that will help reduce these germs. Please follow these instructions for Bactroban use:      ____The day before surgery in the morning  Date________    ____The day before surgery in the evening              Date________    ____The day of surgery in the morning    Date________    **Squirt ½ package of Bactroban Ointment onto a cotton applicator and apply to inside of 1st nostril.  Squirt the remaining Bactroban and apply to the inside of the other nostril.

## 2020-10-06 ENCOUNTER — LAB (OUTPATIENT)
Dept: LAB | Facility: HOSPITAL | Age: 70
End: 2020-10-06

## 2020-10-06 DIAGNOSIS — Z01.818 OTHER SPECIFIED PRE-OPERATIVE EXAMINATION: ICD-10-CM

## 2020-10-06 PROCEDURE — C9803 HOPD COVID-19 SPEC COLLECT: HCPCS

## 2020-10-06 PROCEDURE — U0004 COV-19 TEST NON-CDC HGH THRU: HCPCS

## 2020-10-07 LAB — SARS-COV-2 RNA RESP QL NAA+PROBE: NOT DETECTED

## 2020-10-08 ENCOUNTER — APPOINTMENT (OUTPATIENT)
Dept: GENERAL RADIOLOGY | Facility: HOSPITAL | Age: 70
End: 2020-10-08

## 2020-10-08 ENCOUNTER — ANESTHESIA (OUTPATIENT)
Dept: PERIOP | Facility: HOSPITAL | Age: 70
End: 2020-10-08

## 2020-10-08 ENCOUNTER — ANESTHESIA EVENT (OUTPATIENT)
Dept: PERIOP | Facility: HOSPITAL | Age: 70
End: 2020-10-08

## 2020-10-08 ENCOUNTER — HOSPITAL ENCOUNTER (INPATIENT)
Facility: HOSPITAL | Age: 70
LOS: 1 days | Discharge: HOME HEALTH | End: 2020-10-09
Attending: ORTHOPAEDIC SURGERY | Admitting: ORTHOPAEDIC SURGERY

## 2020-10-08 DIAGNOSIS — M17.11 PRIMARY OSTEOARTHRITIS OF RIGHT KNEE: Primary | ICD-10-CM

## 2020-10-08 LAB
GLUCOSE BLDC GLUCOMTR-MCNC: 133 MG/DL (ref 70–130)
GLUCOSE BLDC GLUCOMTR-MCNC: 169 MG/DL (ref 70–130)
GLUCOSE BLDC GLUCOMTR-MCNC: 206 MG/DL (ref 70–130)
GLUCOSE BLDC GLUCOMTR-MCNC: 219 MG/DL (ref 70–130)
GLUCOSE BLDC GLUCOMTR-MCNC: 324 MG/DL (ref 70–130)

## 2020-10-08 PROCEDURE — 25010000002 MORPHINE PER 10 MG: Performed by: ORTHOPAEDIC SURGERY

## 2020-10-08 PROCEDURE — 25010000002 EPINEPHRINE 30 MG/30ML SOLUTION: Performed by: ORTHOPAEDIC SURGERY

## 2020-10-08 PROCEDURE — C1776 JOINT DEVICE (IMPLANTABLE): HCPCS | Performed by: ORTHOPAEDIC SURGERY

## 2020-10-08 PROCEDURE — 97150 GROUP THERAPEUTIC PROCEDURES: CPT

## 2020-10-08 PROCEDURE — 0SRC0J9 REPLACEMENT OF RIGHT KNEE JOINT WITH SYNTHETIC SUBSTITUTE, CEMENTED, OPEN APPROACH: ICD-10-PCS | Performed by: ORTHOPAEDIC SURGERY

## 2020-10-08 PROCEDURE — 25010000002 FENTANYL CITRATE (PF) 100 MCG/2ML SOLUTION: Performed by: ANESTHESIOLOGY

## 2020-10-08 PROCEDURE — 25010000003 CEFAZOLIN IN DEXTROSE 2-4 GM/100ML-% SOLUTION: Performed by: ORTHOPAEDIC SURGERY

## 2020-10-08 PROCEDURE — 76942 ECHO GUIDE FOR BIOPSY: CPT | Performed by: ORTHOPAEDIC SURGERY

## 2020-10-08 PROCEDURE — 25010000002 MIDAZOLAM PER 1 MG: Performed by: ANESTHESIOLOGY

## 2020-10-08 PROCEDURE — 25010000002 FENTANYL CITRATE (PF) 100 MCG/2ML SOLUTION: Performed by: NURSE ANESTHETIST, CERTIFIED REGISTERED

## 2020-10-08 PROCEDURE — 25010000002 ONDANSETRON PER 1 MG: Performed by: NURSE ANESTHETIST, CERTIFIED REGISTERED

## 2020-10-08 PROCEDURE — 25010000002 ROPIVACAINE PER 1 MG: Performed by: ORTHOPAEDIC SURGERY

## 2020-10-08 PROCEDURE — 97530 THERAPEUTIC ACTIVITIES: CPT

## 2020-10-08 PROCEDURE — 25010000002 KETOROLAC TROMETHAMINE PER 15 MG: Performed by: ORTHOPAEDIC SURGERY

## 2020-10-08 PROCEDURE — 73560 X-RAY EXAM OF KNEE 1 OR 2: CPT

## 2020-10-08 PROCEDURE — 63710000001 INSULIN LISPRO (HUMAN) PER 5 UNITS: Performed by: ORTHOPAEDIC SURGERY

## 2020-10-08 PROCEDURE — 97161 PT EVAL LOW COMPLEX 20 MIN: CPT

## 2020-10-08 PROCEDURE — 25010000002 HYDROMORPHONE PER 4 MG: Performed by: NURSE ANESTHETIST, CERTIFIED REGISTERED

## 2020-10-08 PROCEDURE — 25010000002 ROPIVACAINE PER 1 MG: Performed by: ANESTHESIOLOGY

## 2020-10-08 PROCEDURE — 82962 GLUCOSE BLOOD TEST: CPT

## 2020-10-08 PROCEDURE — 25010000002 PROPOFOL 10 MG/ML EMULSION: Performed by: NURSE ANESTHETIST, CERTIFIED REGISTERED

## 2020-10-08 PROCEDURE — C1713 ANCHOR/SCREW BN/BN,TIS/BN: HCPCS | Performed by: ORTHOPAEDIC SURGERY

## 2020-10-08 PROCEDURE — 25010000002 PHENYLEPHRINE PER 1 ML: Performed by: NURSE ANESTHETIST, CERTIFIED REGISTERED

## 2020-10-08 PROCEDURE — 25010000002 DEXAMETHASONE PER 1 MG: Performed by: NURSE ANESTHETIST, CERTIFIED REGISTERED

## 2020-10-08 DEVICE — COMP FEM/KN VANGUARD INTLK CR 62.5MM NS RT: Type: IMPLANTABLE DEVICE | Site: KNEE | Status: FUNCTIONAL

## 2020-10-08 DEVICE — TRY TIB INTERLK PRI 67MM: Type: IMPLANTABLE DEVICE | Site: KNEE | Status: FUNCTIONAL

## 2020-10-08 DEVICE — CAP TOTL KN CMT PREMIUM: Type: IMPLANTABLE DEVICE | Site: KNEE | Status: FUNCTIONAL

## 2020-10-08 DEVICE — CMT BONE R 1X40: Type: IMPLANTABLE DEVICE | Site: KNEE | Status: FUNCTIONAL

## 2020-10-08 DEVICE — PAT 3PEG THN 31X6.2  31MM: Type: IMPLANTABLE DEVICE | Site: KNEE | Status: FUNCTIONAL

## 2020-10-08 DEVICE — STEM TIB PRI FINN 46X40MM: Type: IMPLANTABLE DEVICE | Site: KNEE | Status: FUNCTIONAL

## 2020-10-08 DEVICE — BEAR TIB/KN VANGUARD CR LIP 10X63/67MM NS: Type: IMPLANTABLE DEVICE | Site: KNEE | Status: FUNCTIONAL

## 2020-10-08 RX ORDER — AMLODIPINE BESYLATE 5 MG/1
5 TABLET ORAL DAILY
Status: DISCONTINUED | OUTPATIENT
Start: 2020-10-08 | End: 2020-10-09 | Stop reason: HOSPADM

## 2020-10-08 RX ORDER — SODIUM CHLORIDE 0.9 % (FLUSH) 0.9 %
3 SYRINGE (ML) INJECTION EVERY 12 HOURS SCHEDULED
Status: DISCONTINUED | OUTPATIENT
Start: 2020-10-08 | End: 2020-10-09 | Stop reason: HOSPADM

## 2020-10-08 RX ORDER — SODIUM CHLORIDE 450 MG/100ML
100 INJECTION, SOLUTION INTRAVENOUS CONTINUOUS
Status: DISCONTINUED | OUTPATIENT
Start: 2020-10-08 | End: 2020-10-09 | Stop reason: HOSPADM

## 2020-10-08 RX ORDER — ONDANSETRON 2 MG/ML
4 INJECTION INTRAMUSCULAR; INTRAVENOUS ONCE AS NEEDED
Status: COMPLETED | OUTPATIENT
Start: 2020-10-08 | End: 2020-10-08

## 2020-10-08 RX ORDER — FERROUS SULFATE 325(65) MG
325 TABLET ORAL
Status: DISCONTINUED | OUTPATIENT
Start: 2020-10-08 | End: 2020-10-09 | Stop reason: HOSPADM

## 2020-10-08 RX ORDER — PROMETHAZINE HYDROCHLORIDE 25 MG/1
25 TABLET ORAL ONCE AS NEEDED
Status: DISCONTINUED | OUTPATIENT
Start: 2020-10-08 | End: 2020-10-08 | Stop reason: HOSPADM

## 2020-10-08 RX ORDER — ACETAMINOPHEN 500 MG
1000 TABLET ORAL ONCE
Status: COMPLETED | OUTPATIENT
Start: 2020-10-08 | End: 2020-10-08

## 2020-10-08 RX ORDER — NALOXONE HCL 0.4 MG/ML
0.2 VIAL (ML) INJECTION AS NEEDED
Status: DISCONTINUED | OUTPATIENT
Start: 2020-10-08 | End: 2020-10-08 | Stop reason: HOSPADM

## 2020-10-08 RX ORDER — METOPROLOL SUCCINATE 25 MG/1
25 TABLET, EXTENDED RELEASE ORAL DAILY
Status: DISCONTINUED | OUTPATIENT
Start: 2020-10-08 | End: 2020-10-09 | Stop reason: HOSPADM

## 2020-10-08 RX ORDER — LEVOTHYROXINE SODIUM 0.05 MG/1
50 TABLET ORAL
Status: DISCONTINUED | OUTPATIENT
Start: 2020-10-08 | End: 2020-10-09 | Stop reason: HOSPADM

## 2020-10-08 RX ORDER — TRANEXAMIC ACID 100 MG/ML
INJECTION, SOLUTION INTRAVENOUS AS NEEDED
Status: DISCONTINUED | OUTPATIENT
Start: 2020-10-08 | End: 2020-10-08 | Stop reason: SURG

## 2020-10-08 RX ORDER — CLINDAMYCIN PHOSPHATE 900 MG/50ML
900 INJECTION INTRAVENOUS EVERY 8 HOURS
Status: COMPLETED | OUTPATIENT
Start: 2020-10-08 | End: 2020-10-09

## 2020-10-08 RX ORDER — FLUMAZENIL 0.1 MG/ML
0.2 INJECTION INTRAVENOUS AS NEEDED
Status: DISCONTINUED | OUTPATIENT
Start: 2020-10-08 | End: 2020-10-08 | Stop reason: HOSPADM

## 2020-10-08 RX ORDER — FENTANYL CITRATE 50 UG/ML
50 INJECTION, SOLUTION INTRAMUSCULAR; INTRAVENOUS
Status: DISCONTINUED | OUTPATIENT
Start: 2020-10-08 | End: 2020-10-08 | Stop reason: HOSPADM

## 2020-10-08 RX ORDER — ATORVASTATIN CALCIUM 20 MG/1
10 TABLET, FILM COATED ORAL DAILY
Status: DISCONTINUED | OUTPATIENT
Start: 2020-10-08 | End: 2020-10-09 | Stop reason: HOSPADM

## 2020-10-08 RX ORDER — FENTANYL CITRATE 50 UG/ML
50 INJECTION, SOLUTION INTRAMUSCULAR; INTRAVENOUS
Status: COMPLETED | OUTPATIENT
Start: 2020-10-08 | End: 2020-10-08

## 2020-10-08 RX ORDER — DEXAMETHASONE SODIUM PHOSPHATE 10 MG/ML
INJECTION INTRAMUSCULAR; INTRAVENOUS AS NEEDED
Status: DISCONTINUED | OUTPATIENT
Start: 2020-10-08 | End: 2020-10-08 | Stop reason: SURG

## 2020-10-08 RX ORDER — SODIUM CHLORIDE 0.9 % (FLUSH) 0.9 %
10 SYRINGE (ML) INJECTION AS NEEDED
Status: DISCONTINUED | OUTPATIENT
Start: 2020-10-08 | End: 2020-10-08 | Stop reason: HOSPADM

## 2020-10-08 RX ORDER — DEXTROSE MONOHYDRATE 25 G/50ML
25 INJECTION, SOLUTION INTRAVENOUS
Status: DISCONTINUED | OUTPATIENT
Start: 2020-10-08 | End: 2020-10-09 | Stop reason: HOSPADM

## 2020-10-08 RX ORDER — OXYCODONE HYDROCHLORIDE AND ACETAMINOPHEN 5; 325 MG/1; MG/1
2 TABLET ORAL EVERY 4 HOURS PRN
Status: DISCONTINUED | OUTPATIENT
Start: 2020-10-08 | End: 2020-10-09 | Stop reason: HOSPADM

## 2020-10-08 RX ORDER — ACETAMINOPHEN 325 MG/1
325 TABLET ORAL EVERY 4 HOURS PRN
Status: DISCONTINUED | OUTPATIENT
Start: 2020-10-08 | End: 2020-10-09 | Stop reason: HOSPADM

## 2020-10-08 RX ORDER — DIPHENHYDRAMINE HCL 25 MG
25 CAPSULE ORAL
Status: DISCONTINUED | OUTPATIENT
Start: 2020-10-08 | End: 2020-10-08 | Stop reason: HOSPADM

## 2020-10-08 RX ORDER — OXYCODONE HYDROCHLORIDE AND ACETAMINOPHEN 5; 325 MG/1; MG/1
1 TABLET ORAL EVERY 4 HOURS PRN
Status: DISCONTINUED | OUTPATIENT
Start: 2020-10-08 | End: 2020-10-09 | Stop reason: HOSPADM

## 2020-10-08 RX ORDER — CEFAZOLIN SODIUM 2 G/100ML
2 INJECTION, SOLUTION INTRAVENOUS ONCE
Status: COMPLETED | OUTPATIENT
Start: 2020-10-08 | End: 2020-10-08

## 2020-10-08 RX ORDER — LABETALOL HYDROCHLORIDE 5 MG/ML
5 INJECTION, SOLUTION INTRAVENOUS
Status: DISCONTINUED | OUTPATIENT
Start: 2020-10-08 | End: 2020-10-08 | Stop reason: HOSPADM

## 2020-10-08 RX ORDER — OXYCODONE AND ACETAMINOPHEN 7.5; 325 MG/1; MG/1
1 TABLET ORAL ONCE AS NEEDED
Status: DISCONTINUED | OUTPATIENT
Start: 2020-10-08 | End: 2020-10-08 | Stop reason: HOSPADM

## 2020-10-08 RX ORDER — ASPIRIN 325 MG
325 TABLET, DELAYED RELEASE (ENTERIC COATED) ORAL 2 TIMES DAILY WITH MEALS
Status: DISCONTINUED | OUTPATIENT
Start: 2020-10-08 | End: 2020-10-09 | Stop reason: HOSPADM

## 2020-10-08 RX ORDER — CHOLECALCIFEROL (VITAMIN D3) 125 MCG
5 CAPSULE ORAL NIGHTLY PRN
Status: DISCONTINUED | OUTPATIENT
Start: 2020-10-08 | End: 2020-10-09 | Stop reason: HOSPADM

## 2020-10-08 RX ORDER — MORPHINE SULFATE 2 MG/ML
4 INJECTION, SOLUTION INTRAMUSCULAR; INTRAVENOUS
Status: DISCONTINUED | OUTPATIENT
Start: 2020-10-08 | End: 2020-10-09 | Stop reason: HOSPADM

## 2020-10-08 RX ORDER — HYDROCODONE BITARTRATE AND ACETAMINOPHEN 7.5; 325 MG/1; MG/1
1 TABLET ORAL ONCE AS NEEDED
Status: DISCONTINUED | OUTPATIENT
Start: 2020-10-08 | End: 2020-10-08 | Stop reason: HOSPADM

## 2020-10-08 RX ORDER — ONDANSETRON 2 MG/ML
INJECTION INTRAMUSCULAR; INTRAVENOUS AS NEEDED
Status: DISCONTINUED | OUTPATIENT
Start: 2020-10-08 | End: 2020-10-08 | Stop reason: SURG

## 2020-10-08 RX ORDER — KETOROLAC TROMETHAMINE 15 MG/ML
15 INJECTION, SOLUTION INTRAMUSCULAR; INTRAVENOUS EVERY 8 HOURS PRN
Status: DISCONTINUED | OUTPATIENT
Start: 2020-10-08 | End: 2020-10-09 | Stop reason: HOSPADM

## 2020-10-08 RX ORDER — ONDANSETRON 4 MG/1
4 TABLET, FILM COATED ORAL EVERY 6 HOURS PRN
Status: DISCONTINUED | OUTPATIENT
Start: 2020-10-08 | End: 2020-10-09 | Stop reason: HOSPADM

## 2020-10-08 RX ORDER — ONDANSETRON 2 MG/ML
4 INJECTION INTRAMUSCULAR; INTRAVENOUS EVERY 6 HOURS PRN
Status: DISCONTINUED | OUTPATIENT
Start: 2020-10-08 | End: 2020-10-09 | Stop reason: HOSPADM

## 2020-10-08 RX ORDER — NICOTINE POLACRILEX 4 MG
15 LOZENGE BUCCAL
Status: DISCONTINUED | OUTPATIENT
Start: 2020-10-08 | End: 2020-10-09 | Stop reason: HOSPADM

## 2020-10-08 RX ORDER — PROPOFOL 10 MG/ML
VIAL (ML) INTRAVENOUS AS NEEDED
Status: DISCONTINUED | OUTPATIENT
Start: 2020-10-08 | End: 2020-10-08 | Stop reason: SURG

## 2020-10-08 RX ORDER — DIAZEPAM 5 MG/1
5 TABLET ORAL EVERY 6 HOURS PRN
Status: DISCONTINUED | OUTPATIENT
Start: 2020-10-08 | End: 2020-10-09 | Stop reason: HOSPADM

## 2020-10-08 RX ORDER — HYDROMORPHONE HCL 110MG/55ML
PATIENT CONTROLLED ANALGESIA SYRINGE INTRAVENOUS AS NEEDED
Status: DISCONTINUED | OUTPATIENT
Start: 2020-10-08 | End: 2020-10-08 | Stop reason: SURG

## 2020-10-08 RX ORDER — EPHEDRINE SULFATE 50 MG/ML
5 INJECTION, SOLUTION INTRAVENOUS ONCE AS NEEDED
Status: DISCONTINUED | OUTPATIENT
Start: 2020-10-08 | End: 2020-10-08 | Stop reason: HOSPADM

## 2020-10-08 RX ORDER — MIDAZOLAM HYDROCHLORIDE 1 MG/ML
1 INJECTION INTRAMUSCULAR; INTRAVENOUS
Status: DISCONTINUED | OUTPATIENT
Start: 2020-10-08 | End: 2020-10-08 | Stop reason: HOSPADM

## 2020-10-08 RX ORDER — PROMETHAZINE HYDROCHLORIDE 25 MG/1
25 SUPPOSITORY RECTAL ONCE AS NEEDED
Status: DISCONTINUED | OUTPATIENT
Start: 2020-10-08 | End: 2020-10-08 | Stop reason: HOSPADM

## 2020-10-08 RX ORDER — NALOXONE HCL 0.4 MG/ML
0.4 VIAL (ML) INJECTION
Status: DISCONTINUED | OUTPATIENT
Start: 2020-10-08 | End: 2020-10-09 | Stop reason: HOSPADM

## 2020-10-08 RX ORDER — SODIUM CHLORIDE 0.9 % (FLUSH) 0.9 %
1-10 SYRINGE (ML) INJECTION AS NEEDED
Status: DISCONTINUED | OUTPATIENT
Start: 2020-10-08 | End: 2020-10-09 | Stop reason: HOSPADM

## 2020-10-08 RX ORDER — LIDOCAINE HYDROCHLORIDE AND EPINEPHRINE BITARTRATE 20; .01 MG/ML; MG/ML
INJECTION, SOLUTION SUBCUTANEOUS
Status: COMPLETED | OUTPATIENT
Start: 2020-10-08 | End: 2020-10-08

## 2020-10-08 RX ORDER — HYDROMORPHONE HYDROCHLORIDE 1 MG/ML
0.5 INJECTION, SOLUTION INTRAMUSCULAR; INTRAVENOUS; SUBCUTANEOUS
Status: DISCONTINUED | OUTPATIENT
Start: 2020-10-08 | End: 2020-10-08 | Stop reason: HOSPADM

## 2020-10-08 RX ORDER — CELECOXIB 200 MG/1
200 CAPSULE ORAL ONCE
Status: COMPLETED | OUTPATIENT
Start: 2020-10-08 | End: 2020-10-08

## 2020-10-08 RX ORDER — SODIUM CHLORIDE 0.9 % (FLUSH) 0.9 %
10 SYRINGE (ML) INJECTION EVERY 12 HOURS SCHEDULED
Status: DISCONTINUED | OUTPATIENT
Start: 2020-10-08 | End: 2020-10-08 | Stop reason: HOSPADM

## 2020-10-08 RX ORDER — PROMETHAZINE HYDROCHLORIDE 12.5 MG/1
12.5 TABLET ORAL EVERY 6 HOURS PRN
Status: DISCONTINUED | OUTPATIENT
Start: 2020-10-08 | End: 2020-10-09 | Stop reason: HOSPADM

## 2020-10-08 RX ORDER — FLUOXETINE HYDROCHLORIDE 20 MG/1
40 CAPSULE ORAL DAILY
Status: DISCONTINUED | OUTPATIENT
Start: 2020-10-08 | End: 2020-10-09 | Stop reason: HOSPADM

## 2020-10-08 RX ORDER — MAGNESIUM HYDROXIDE 1200 MG/15ML
LIQUID ORAL AS NEEDED
Status: DISCONTINUED | OUTPATIENT
Start: 2020-10-08 | End: 2020-10-08 | Stop reason: HOSPADM

## 2020-10-08 RX ORDER — FAMOTIDINE 10 MG/ML
20 INJECTION, SOLUTION INTRAVENOUS ONCE
Status: COMPLETED | OUTPATIENT
Start: 2020-10-08 | End: 2020-10-08

## 2020-10-08 RX ORDER — ROPIVACAINE HYDROCHLORIDE 2 MG/ML
INJECTION, SOLUTION EPIDURAL; INFILTRATION; PERINEURAL
Status: COMPLETED | OUTPATIENT
Start: 2020-10-08 | End: 2020-10-08

## 2020-10-08 RX ORDER — DOCUSATE SODIUM 100 MG/1
100 CAPSULE, LIQUID FILLED ORAL 2 TIMES DAILY PRN
Status: DISCONTINUED | OUTPATIENT
Start: 2020-10-08 | End: 2020-10-09 | Stop reason: HOSPADM

## 2020-10-08 RX ORDER — LIDOCAINE HYDROCHLORIDE 20 MG/ML
INJECTION, SOLUTION INFILTRATION; PERINEURAL AS NEEDED
Status: DISCONTINUED | OUTPATIENT
Start: 2020-10-08 | End: 2020-10-08 | Stop reason: SURG

## 2020-10-08 RX ORDER — SODIUM CHLORIDE, SODIUM LACTATE, POTASSIUM CHLORIDE, CALCIUM CHLORIDE 600; 310; 30; 20 MG/100ML; MG/100ML; MG/100ML; MG/100ML
9 INJECTION, SOLUTION INTRAVENOUS CONTINUOUS
Status: DISCONTINUED | OUTPATIENT
Start: 2020-10-08 | End: 2020-10-09 | Stop reason: HOSPADM

## 2020-10-08 RX ORDER — DIPHENHYDRAMINE HYDROCHLORIDE 50 MG/ML
12.5 INJECTION INTRAMUSCULAR; INTRAVENOUS
Status: DISCONTINUED | OUTPATIENT
Start: 2020-10-08 | End: 2020-10-08 | Stop reason: HOSPADM

## 2020-10-08 RX ADMIN — HYDROMORPHONE HYDROCHLORIDE 0.25 MG: 2 INJECTION, SOLUTION INTRAMUSCULAR; INTRAVENOUS; SUBCUTANEOUS at 07:49

## 2020-10-08 RX ADMIN — FENTANYL CITRATE 50 MCG: 50 INJECTION, SOLUTION INTRAMUSCULAR; INTRAVENOUS at 10:22

## 2020-10-08 RX ADMIN — FERROUS SULFATE TAB 325 MG (65 MG ELEMENTAL FE) 325 MG: 325 (65 FE) TAB at 11:55

## 2020-10-08 RX ADMIN — CELECOXIB 200 MG: 200 CAPSULE ORAL at 06:17

## 2020-10-08 RX ADMIN — ATORVASTATIN CALCIUM 10 MG: 20 TABLET, FILM COATED ORAL at 11:55

## 2020-10-08 RX ADMIN — ACETAMINOPHEN 1000 MG: 500 TABLET ORAL at 06:16

## 2020-10-08 RX ADMIN — HYDROMORPHONE HYDROCHLORIDE 0.25 MG: 2 INJECTION, SOLUTION INTRAMUSCULAR; INTRAVENOUS; SUBCUTANEOUS at 08:01

## 2020-10-08 RX ADMIN — INSULIN LISPRO 7 UNITS: 100 INJECTION, SOLUTION INTRAVENOUS; SUBCUTANEOUS at 17:38

## 2020-10-08 RX ADMIN — ONDANSETRON HYDROCHLORIDE 4 MG: 2 INJECTION, SOLUTION INTRAMUSCULAR; INTRAVENOUS at 10:25

## 2020-10-08 RX ADMIN — MUPIROCIN 1 APPLICATION: 20 OINTMENT TOPICAL at 20:34

## 2020-10-08 RX ADMIN — LIDOCAINE HYDROCHLORIDE 80 MG: 20 INJECTION, SOLUTION INFILTRATION; PERINEURAL at 07:17

## 2020-10-08 RX ADMIN — KETOROLAC TROMETHAMINE 15 MG: 15 INJECTION, SOLUTION INTRAMUSCULAR; INTRAVENOUS at 23:03

## 2020-10-08 RX ADMIN — FLUOXETINE HYDROCHLORIDE 40 MG: 20 CAPSULE ORAL at 12:55

## 2020-10-08 RX ADMIN — SODIUM CHLORIDE, POTASSIUM CHLORIDE, SODIUM LACTATE AND CALCIUM CHLORIDE 9 ML/HR: 600; 310; 30; 20 INJECTION, SOLUTION INTRAVENOUS at 06:27

## 2020-10-08 RX ADMIN — PROPOFOL 170 MG: 10 INJECTION, EMULSION INTRAVENOUS at 07:17

## 2020-10-08 RX ADMIN — SODIUM CHLORIDE 100 ML/HR: 4.5 INJECTION, SOLUTION INTRAVENOUS at 11:55

## 2020-10-08 RX ADMIN — TRANEXAMIC ACID 1000 MG: 100 INJECTION, SOLUTION INTRAVENOUS at 07:26

## 2020-10-08 RX ADMIN — FENTANYL CITRATE 25 MCG: 50 INJECTION INTRAMUSCULAR; INTRAVENOUS at 07:40

## 2020-10-08 RX ADMIN — DEXAMETHASONE SODIUM PHOSPHATE 8 MG: 10 INJECTION INTRAMUSCULAR; INTRAVENOUS at 07:27

## 2020-10-08 RX ADMIN — FENTANYL CITRATE 50 MCG: 50 INJECTION INTRAMUSCULAR; INTRAVENOUS at 06:47

## 2020-10-08 RX ADMIN — MIDAZOLAM 2 MG: 1 INJECTION INTRAMUSCULAR; INTRAVENOUS at 06:47

## 2020-10-08 RX ADMIN — SODIUM CHLORIDE, POTASSIUM CHLORIDE, SODIUM LACTATE AND CALCIUM CHLORIDE: 600; 310; 30; 20 INJECTION, SOLUTION INTRAVENOUS at 08:42

## 2020-10-08 RX ADMIN — CLINDAMYCIN PHOSPHATE 900 MG: 900 INJECTION, SOLUTION INTRAVENOUS at 17:05

## 2020-10-08 RX ADMIN — OXYCODONE HYDROCHLORIDE AND ACETAMINOPHEN 1 TABLET: 5; 325 TABLET ORAL at 12:56

## 2020-10-08 RX ADMIN — FENTANYL CITRATE 50 MCG: 50 INJECTION INTRAMUSCULAR; INTRAVENOUS at 07:26

## 2020-10-08 RX ADMIN — PROPOFOL 20 MG: 10 INJECTION, EMULSION INTRAVENOUS at 07:50

## 2020-10-08 RX ADMIN — FAMOTIDINE 20 MG: 10 INJECTION, SOLUTION INTRAVENOUS at 06:34

## 2020-10-08 RX ADMIN — OXYCODONE HYDROCHLORIDE AND ACETAMINOPHEN 2 TABLET: 5; 325 TABLET ORAL at 17:38

## 2020-10-08 RX ADMIN — CLINDAMYCIN PHOSPHATE 900 MG: 900 INJECTION, SOLUTION INTRAVENOUS at 23:35

## 2020-10-08 RX ADMIN — OXYCODONE HYDROCHLORIDE AND ACETAMINOPHEN 1 TABLET: 5; 325 TABLET ORAL at 21:43

## 2020-10-08 RX ADMIN — ONDANSETRON HYDROCHLORIDE 4 MG: 2 SOLUTION INTRAMUSCULAR; INTRAVENOUS at 08:20

## 2020-10-08 RX ADMIN — PHENYLEPHRINE HYDROCHLORIDE 100 MCG: 10 INJECTION INTRAVENOUS at 08:32

## 2020-10-08 RX ADMIN — ASPIRIN 325 MG: 325 TABLET, COATED ORAL at 17:05

## 2020-10-08 RX ADMIN — ROPIVACAINE HYDROCHLORIDE 20 ML: 2 INJECTION, SOLUTION EPIDURAL; INFILTRATION at 06:52

## 2020-10-08 RX ADMIN — FENTANYL CITRATE 25 MCG: 50 INJECTION INTRAMUSCULAR; INTRAVENOUS at 07:46

## 2020-10-08 RX ADMIN — CEFAZOLIN SODIUM 2 G: 2 INJECTION, SOLUTION INTRAVENOUS at 07:22

## 2020-10-08 RX ADMIN — LIDOCAINE HYDROCHLORIDE AND EPINEPHRINE 5 ML: 20; 10 INJECTION, SOLUTION INFILTRATION; PERINEURAL at 06:52

## 2020-10-08 RX ADMIN — CEFAZOLIN SODIUM 2 G: 2 INJECTION, SOLUTION INTRAVENOUS at 08:35

## 2020-10-08 NOTE — H&P
"  Orthopaedic Surgery History and Physical    Patient Name:  Vanessa Dorsey  YOB: 1950  Age: 70 y.o.  Medical Records Number:  [id number]    Date of Admission:  10/8/2020  5:29 AM    Chief Complaint:  Primary osteoarthritis of right knee [M17.11]    Vanessa Dorsey is a 70 y.o. female who presents c/o severe right knee pain.  The pain has been on and off for many years, worsening to the point where the pain is becoming disabling.  The pain is a constant dull ache with occasional sharp, stabbing pain.  The patient has failed conservative treatment and would like to proceed with right total knee arthroplasty.    /81 (BP Location: Right arm, Patient Position: Lying)   Pulse 80   Temp 98.3 °F (36.8 °C) (Oral)   Resp 16   Ht 165.1 cm (65\")   Wt 61.9 kg (136 lb 7.4 oz)   SpO2 96%   BMI 22.71 kg/m²     Past Medical History:    Past Medical History:   Diagnosis Date   • Arthritis    • Diabetes mellitus (CMS/HCC)    • Disease of thyroid gland    • Elevated cholesterol    • Hyperlipidemia    • Hypertension    • Right knee pain        Past Surgical History:   Past Surgical History:   Procedure Laterality Date   • COLONOSCOPY     • HYSTERECTOMY     • KNEE ARTHROSCOPY MENISCUS TRANSPLANT Right     AUG 2019   • LASIK Bilateral    • SHOULDER ARTHROSCOPY Bilateral    • TUBAL ABDOMINAL LIGATION         Social History:    Social History     Socioeconomic History   • Marital status:      Spouse name: Not on file   • Number of children: Not on file   • Years of education: Not on file   • Highest education level: Not on file   Tobacco Use   • Smoking status: Former Smoker     Packs/day: 1.00     Years: 40.00     Pack years: 40.00     Types: Cigarettes   • Smokeless tobacco: Never Used   • Tobacco comment: quit n15 years ago   Substance and Sexual Activity   • Alcohol use: No     Comment: 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   • Drug use: Yes     Types: Marijuana     Comment: IN HER 20'S   • Sexual " activity: Defer       Family History:    Family History   Problem Relation Age of Onset   • Diabetes Mother    • COPD Mother    • Diabetes Father    • COPD Sister    • Malig Hyperthermia Neg Hx        Current Medications:  Scheduled Meds:ceFAZolin, 2 g, Intravenous, Once  sodium chloride, 10 mL, Intravenous, Q12H      Continuous Infusions:lactated ringers, 9 mL/hr, Last Rate: 9 mL/hr (10/08/20 0627)      PRN Meds:.fentanyl  •  midazolam  •  sodium chloride    Current Facility-Administered Medications:   •  ceFAZolin in dextrose (ANCEF) IVPB solution 2 g, 2 g, Intravenous, Once, Koko Small MD  •  fentaNYL citrate (PF) (SUBLIMAZE) injection 50 mcg, 50 mcg, Intravenous, Q5 Min PRN, Alonso Ortega MD, 50 mcg at 10/08/20 0647  •  lactated ringers infusion, 9 mL/hr, Intravenous, Continuous, Alonso Ortega MD, Last Rate: 9 mL/hr at 10/08/20 0627, 9 mL/hr at 10/08/20 0627  •  midazolam (VERSED) injection 1 mg, 1 mg, Intravenous, Q10 Min PRN, Alonso Ortega MD, 2 mg at 10/08/20 0647  •  sodium chloride 0.9 % flush 10 mL, 10 mL, Intravenous, Q12H, Alonso Ortega MD  •  sodium chloride 0.9 % flush 10 mL, 10 mL, Intravenous, PRN, Alonso Ortega MD    Allergies:    Allergies   Allergen Reactions   • Ciprofloxacin    • Levemir [Insulin Detemir] Hives       Review of Systems:   HEENT: Patient denies any headaches, vision changes, change in hearing, or tinnitus, Patient denies any rhinorrhea,epistaxis, sinus pain, mouth or dental problems, sore throat or hoarseness, or dysphagia  Pulmonary: Patient denies any cough, congestion, SOA, or wheezing  Cardiovascular: Patient denies any chest pain, dyspnea, palpitations, weakness, intolerance of exercise, varicosities, swelling of extremities, known murmur  Gastrointestinal:  Patient denies nausea, vomiting, diarrhea, constipation, loss  of appetite, change in appetite, dysphagia, gas, heartburn, melena, change in bowel habits, use of laxatives or other drugs to alter the function of the  gastrointestinal tract.  Genital/Urinary: Patient denies dysuria, change in color of urine, change in frequency of urination, pain with urgency, incontinence, retention, or nocturia.  Musculoskeletal: Patient denies increased warmth; redness; or swelling of joints; limitation of function; deformity; crepitation: pain in a joint or an extremity, the neck, or the back, especially with movement.  Neurological: Patient denies dizziness, tremor, ataxia, difficulty in speaking, change in speech, paresthesia, loss of sensation, seizures, syncope, changes in memory.  Endocrine system: Patient denies tremors, palpitations, intolerance of heat or cold, polyuria, polydipsia, polyphagia, diaphoresis, exophthalmos, or goiter.  Psychological: Patient denies thoughts/plans or harming self or other; depression,  insomnia, night terrors, kraig, memory loss, disorientation.  Skin: Patient denies any bruising, rashes, discoloration, pruritus, wounds, ulcers, decubiti, changes in the hair or nails  Hematopoietic: Patient denies history of spontaneous or excessive bleeding, epistaxis, hematuria, melena, fatigue, enlarged or tender lymph nodes, pallor, history of anemia.      Physical Exam:   Awake, A&O x3, affect normal, no acute distress  Ambulating with a limp due to knee pain  Knee ROM is limited due to pain (5-115)  Strength is 4/5 in the quad, hamstring and calf  Cap refill is normal, Sensation intact    Card:  RR, HD Stable  Pulm:  Regular breathing, no S.O.A  Abd:  Soft, NT, ND    Lab Results (last 24 hours)     Procedure Component Value Units Date/Time    POC Glucose Once [[id number]]  (Abnormal) Collected: 10/08/20 0607    Specimen: Blood Updated: 10/08/20 0619     Glucose 133 mg/dL     SCANNED - LABS [[id number]] Resulted: 10/08/20      Updated: 10/07/20 1101          Xr Knee 1 Or 2 View Right    Result Date: 10/2/2020  Narrative: PA AND LATERAL CHEST X-RAY AND 2 VIEWS OF THE RIGHT KNEE 10/01/2020  CLINICAL HISTORY: Preop for  right knee surgery scheduled 10/08/2020, history of hypertension.  CHEST: This is correlated to prior chest x-ray on 11/29/2017.  FINDINGS: The cardiomediastinal silhouette and pulmonary vasculature are within normal limits. There is calcified granuloma in the posterior right lung base unchanged. Lungs are clear. Costophrenic angles are sharp.      Impression: No active disease is seen in the chest with no change when compared to prior chest x-ray 11/19/2017.  RIGHT KNEE: 2 views including standing AP and lateral views of the right knee are submitted for interpretation. There is severe joint space narrowing of the lateral compartment of the right knee, mild joint space narrowing medial and patellofemoral compartment. There is mild-to-moderate marginal osteophytic spurring in medial, lateral and patellofemoral compartment compatible with tricompartmental osteoarthritic change. No acute fracture or malalignment is seen in bones of the knee. On the lateral view, there is a knee joint effusion in the right suprapatellar bursa.  This report was finalized on 10/2/2020 9:25 AM by Dr. Koko Escoto M.D.      Xr Chest Pa & Lateral    Result Date: 10/2/2020  Narrative: PA AND LATERAL CHEST X-RAY AND 2 VIEWS OF THE RIGHT KNEE 10/01/2020  CLINICAL HISTORY: Preop for right knee surgery scheduled 10/08/2020, history of hypertension.  CHEST: This is correlated to prior chest x-ray on 11/29/2017.  FINDINGS: The cardiomediastinal silhouette and pulmonary vasculature are within normal limits. There is calcified granuloma in the posterior right lung base unchanged. Lungs are clear. Costophrenic angles are sharp.      Impression: No active disease is seen in the chest with no change when compared to prior chest x-ray 11/19/2017.  RIGHT KNEE: 2 views including standing AP and lateral views of the right knee are submitted for interpretation. There is severe joint space narrowing of the lateral compartment of the right knee, mild joint  space narrowing medial and patellofemoral compartment. There is mild-to-moderate marginal osteophytic spurring in medial, lateral and patellofemoral compartment compatible with tricompartmental osteoarthritic change. No acute fracture or malalignment is seen in bones of the knee. On the lateral view, there is a knee joint effusion in the right suprapatellar bursa.  This report was finalized on 10/2/2020 9:25 AM by Dr. Koko Escoto M.D.        Assessment:  End-stage Primary Right Knee Osteoarthritis    Plan:  Patient's pain is becoming disabling, despite extensive conservative treatment.  Radiographs reveal end-stage degenerative changes.  The risks of surgery, including, but not limited to, heart attack, stroke, dying, DVT, nerve injury, vascular injury, arthrofibrosis and infection were discussed.  The alternatives and benefits were also discussed.  All questions answered and the patient wishes to proceed with right total knee arthroplasty.    Koko Tarango PA-C  [facility] [place] [address]  [phone]    10/8/2020    CC: Provider, No Known, Koko Small MD

## 2020-10-08 NOTE — DISCHARGE PLACEMENT REQUEST
"Vanessa Bates (70 y.o. Female)     Date of Birth Social Security Number Address Home Phone MRN    1950  [id number]   [place] [address] [phone] [id number]    Mu-ism Marital Status          Non-Sikhism        Admission Date Admission Type Admitting Provider Attending Provider Department, Room/Bed    10/8/20 Elective Koko Small MD Goodin, Robert A, MD [address], P779/1    Discharge Date Discharge Disposition Discharge Destination                       Attending Provider: Koko Small MD    Allergies: Ciprofloxacin, Levemir [Insulin Detemir]    Isolation: None   Infection: None   Code Status: CPR    Ht: 165.1 cm (65\")   Wt: 61.9 kg (136 lb 7.4 oz)    Admission Cmt: None   Principal Problem: None                Active Insurance as of 10/8/2020     Primary Coverage     Payor Plan Insurance Group Employer/Plan Group    MEDICARE MEDICARE A & B      Payor Plan Address Payor Plan Phone Number Payor Plan Fax Number Effective Dates    [address] [phone]  2/1/2017 - None Entered    [facility] [id number]       Subscriber Name Subscriber Birth Date Member ID       VANESSA BATES 1950 [id number]           Secondary Coverage     Payor Plan Insurance Group Employer/Plan Group    AARP [employer] SUP [employer] HE[employer] CARE OPTIONS      Payor Plan Address Payor Plan Phone Number Payor Plan Fax Number Effective Dates    [facility] [phone]  2/1/2020 - None Entered    [address]       [place] [address]       Subscriber Name Subscriber Birth Date Member ID       VANESSA BATES 1950 [id number]                 Emergency Contacts      (Rel.) Home Phone Work Phone Mobile Phone    Karey Craft (Sister) [phone] -- --          "

## 2020-10-08 NOTE — PLAN OF CARE
Goal Outcome Evaluation:  Plan of Care Reviewed With: patient  Progress: improving  Outcome Summary: S/P RTKA. VSS. Dressing c/d/i. Pain controlled with PO pain med. Up to chair and worked with PT. Ambulates with assist and walker. Voiding without difficulty. Discussed blood glucose med and monitoring. Started SSI per order. Plans to d/c home with HH possibly tomorrow.

## 2020-10-08 NOTE — ANESTHESIA POSTPROCEDURE EVALUATION
"Patient: Vanessa Dorsey    Procedure Summary     Date: 10/08/20 Room / Location: [facility] OR [address] [place] [address] MAIN OR    Anesthesia Start: 0710 Anesthesia Stop: 0902    Procedure: RIGHT TOTAL KNEE ARTHROPLASTY (Right Knee) Diagnosis:     Surgeon: Koko Small MD Provider: Alonso Ortega MD    Anesthesia Type: general with block ASA Status: 2          Anesthesia Type: general with block    Vitals  Vitals Value Taken Time   /83 10/08/20 1000   Temp 36.7 °C (98.1 °F) 10/08/20 0900   Pulse 90 10/08/20 1001   Resp 12 10/08/20 1000   SpO2 97 % 10/08/20 1001   Vitals shown include unvalidated device data.        Post Anesthesia Care and Evaluation    Patient location during evaluation: bedside  Patient participation: complete - patient participated  Level of consciousness: awake and alert  Pain management: adequate  Airway patency: patent  Anesthetic complications: No anesthetic complications    Cardiovascular status: acceptable  Respiratory status: acceptable  Hydration status: acceptable    Comments: /83   Pulse 88   Temp 36.7 °C (98.1 °F) (Oral)   Resp 12   Ht 165.1 cm (65\")   Wt 61.9 kg (136 lb 7.4 oz)   SpO2 98%   BMI 22.71 kg/m²           "

## 2020-10-08 NOTE — PLAN OF CARE
Problem: Adult Inpatient Plan of Care  Goal: Plan of Care Review  Recent Flowsheet Documentation  Taken 10/8/2020 1330 by Sammi Linda PT  Progress: improving  Plan of Care Reviewed With: patient  Outcome Summary: Pt is 69 yo f s/p Rt TKA 10/08/2020. PMH significant for arthritis, DM, elevated cholesterol, HLD, and HTN. She presents to therapy very motivated to move and get out of bed, completing most bed mobility activites with CGA requiring only min A to move her Rt leg to EOB at one point. Sit to stnad transfers completed with SBA and use of FWW, pt amb approx 70 ft in fuller with FWW and CGA throughout. Her gait pattern was slowed and demonstrated B decreased heel strike but no severe gait deficits were identified. She assume a typical reciprocal gait pattern with only mild weight shifting deficits onto the Rt side towards beginning of treatment. She will benefit from skilled therapy services while admitted to improve listed gait deficits, increase AROM of the RLE and improve safety with functional trasfers in order to return home independently. She demonstrates great safety skills and anticipate d/c to home with HH at this time, will continue to monitor.   Patient was intermittently wearing a face mask during this therapy encounter. Therapist used appropriate personal protective equipment including eye protection, mask, and gloves.  Mask used was standard procedure mask. Appropriate PPE was worn during the entire therapy session. Hand hygiene was completed before and after therapy session. Patient is not in enhanced droplet precautions.

## 2020-10-08 NOTE — THERAPY EVALUATION
Patient Name: Vanessa Dorsey  : 1950    MRN: [id number]                              Today's Date: 10/8/2020       Admit Date: 10/8/2020    Visit Dx: No diagnosis found.  Patient Active Problem List   Diagnosis   • Hyperglycemia   • Essential hypertension   • Type 2 diabetes mellitus with hyperglycemia (CMS/HCC)   • Hypothyroidism   • UTI (urinary tract infection)   • Dehydration   • Viral illness   • Primary osteoarthritis of right knee     Past Medical History:   Diagnosis Date   • Arthritis    • Diabetes mellitus (CMS/HCC)    • Disease of thyroid gland    • Elevated cholesterol    • Hyperlipidemia    • Hypertension    • Right knee pain      Past Surgical History:   Procedure Laterality Date   • COLONOSCOPY     • HYSTERECTOMY     • KNEE ARTHROSCOPY MENISCUS TRANSPLANT Right     AUG 2019   • LASIK Bilateral    • SHOULDER ARTHROSCOPY Bilateral    • TUBAL ABDOMINAL LIGATION       General Information     Row Name 10/08/20 [facility]7          Physical Therapy Time and Intention    Document Type  evaluation  -AP     Mode of Treatment  physical therapy  -AP     Row Name 10/08/20 1327          General Information    Patient Profile Reviewed  yes  -AP     Prior Level of Function  independent:  -AP     Existing Precautions/Restrictions  fall  -AP     Barriers to Rehab  none identified  -AP     Row Name 10/08/20 1327          Living Environment    Lives With  alone  -AP     Row Name 10/08/20 1327          Home Main Entrance    Number of Stairs, Main Entrance  none  -AP     Row Name 10/08/20 1327          Stairs Within Home, Primary    Number of Stairs, Within Home, Primary  none  -AP     Row Name 10/08/20 1327          Cognition    Orientation Status (Cognition)  oriented x 4  -AP     Row Name 10/08/20 1327          Safety Issues, Functional Mobility    Impairments Affecting Function (Mobility)  endurance/activity tolerance;pain;strength  -AP       User Key  (r) = Recorded By, (t) = Taken By, (c) = Cosigned By     Initials Name Provider Type    Sammi Chavez PT Physical Therapist        Mobility     Row Name 10/08/20 1327          Bed Mobility    Bed Mobility  bed mobility (all) activities  -AP     All Activities, [place] (Bed Mobility)  minimum assist (75% patient effort)  -AP     Assistive Device (Bed Mobility)  bed rails;head of bed elevated  -AP     Comment (Bed Mobility)  needed only min A lifting leg at one point off EOB.  -AP     Row Name 10/08/20 1327          Bed-Chair Transfer    Bed-Chair [place] (Transfers)  contact guard  -AP     Assistive Device (Bed-Chair Transfers)  walker, front-wheeled  -AP     Row Name 10/08/20 1327          Sit-Stand Transfer    Sit-Stand [place] (Transfers)  standby assist;verbal cues  -AP     Assistive Device (Sit-Stand Transfers)  walker, front-wheeled  -AP     Row Name 10/08/20 1327          Gait/Stairs (Locomotion)    [place] Level (Gait)  contact guard  -AP     Assistive Device (Gait)  walker, front-wheeled  -AP     Distance in Feet (Gait)  70 ft  -AP     Bilateral Gait Deviations  heel strike decreased  -AP     Right Sided Gait Deviations  weight shift ability decreased very slight weight shift change, pt demonstrated good reciprocal gait pattern.  -AP       User Key  (r) = Recorded By, (t) = Taken By, (c) = Cosigned By    Initials Name Provider Type    Sammi Chavez PT Physical Therapist        Obj/Interventions     Row Name 10/08/20 1329          Range of Motion Comprehensive    General Range of Motion  lower extremity range of motion deficits identified  -AP     Row Name 10/08/20 1329          Strength Comprehensive (MMT)    General Manual Muscle Testing (MMT) Assessment  lower extremity strength deficits identified  -AP     Comment, General Manual Muscle Testing (MMT) Assessment  Pt demonstrated Rt knee AROM 5-85 degrees and passively 0-90 degrees.  -AP     Row Name 10/08/20 1329          Motor Skills    Therapeutic Exercise  knee completed all therex  per TKA protocol. x 10 ea.  -AP       User Key  (r) = Recorded By, (t) = Taken By, (c) = Cosigned By    Initials Name Provider Type    Sammi Chavez PT Physical Therapist        Goals/Plan     Row Name 10/08/20 0494          Bed Mobility Goal 1 (PT)    Activity/Assistive Device (Bed Mobility Goal 1, PT)  bed mobility activities, all  -AP     [place] Level/Cues Needed (Bed Mobility Goal 1, PT)  modified independence  -AP     Time Frame (Bed Mobility Goal 1, PT)  3 days;short term goal (STG)  -AP     Row Name 10/08/20 9053          Transfer Goal 1 (PT)    Activity/Assistive Device (Transfer Goal 1, PT)  transfers, all  -AP     [place] Level/Cues Needed (Transfer Goal 1, PT)  modified independence  -AP     Time Frame (Transfer Goal 1, PT)  3 days  -AP     Row Name 10/08/20 9714          Gait Training Goal 1 (PT)    Activity/Assistive Device (Gait Training Goal 1, PT)  gait (walking locomotion)  -AP     [place] Level (Gait Training Goal 1, PT)  modified independence  -AP     Distance (Gait Training Goal 1, PT)  100 ft  -AP     Time Frame (Gait Training Goal 1, PT)  3 days  -AP       User Key  (r) = Recorded By, (t) = Taken By, (c) = Cosigned By    Initials Name Provider Type    Sammi Chavez PT Physical Therapist        Clinical Impression     Row Name 10/08/20 6093          Pain    Additional Documentation  Pain Scale: Numbers Pre/Post-Treatment (Group)  -AP     Row Name 10/08/20 6154          Pain Scale: Numbers Pre/Post-Treatment    Pretreatment Pain Rating  5/10  -AP     Posttreatment Pain Rating  5/10  -AP     Pain Location - Orientation  incisional  -AP     Pain Location  knee  -AP     Pain Intervention(s)  Medication (See MAR);Cold applied;Repositioned;Ambulation/increased activity;Elevated  -AP     Row Name 10/08/20 9061          Plan of Care Review    Plan of Care Reviewed With  patient  -AP     Progress  improving  -AP     Outcome Summary  Pt is 69 yo f s/p Rt TKA 10/08/2020. PMH  significant for arthritis, DM, elevated cholesterol, HLD, and HTN. She presents to therapy very motivated to move and get out of bed, completing most bed mobility activites with CGA requiring only min A to move her Rt leg to EOB at one point. Sit to stnad transfers completed with SBA and use of FWW, pt amb approx 70 ft in fuller with FWW and CGA throughout. Her gait pattern was slowed and demonstrated B decreased heel strike but no severe gait deficits were identified. She assume a typical reciprocal gait pattern with only mild weight shifting deficits onto the Rt side towards beginning of treatment. She will benefit from skilled therapy services while admitted to improve listed gait deficits, increase AROM of the RLE and improve safety with functional trasfers in order to return home independently. She demonstrates great safety skills and anticipate d/c to home with HH at this time, will continue to monitor.  -AP     Row Name 10/08/20 1330          Therapy Assessment/Plan (PT)    Patient/Family Therapy Goals Statement (PT)  Return home alone asap.  -AP     Rehab Potential (PT)  good, to achieve stated therapy goals  -AP     Criteria for Skilled Interventions Met (PT)  yes  -AP     Predicted Duration of Therapy Intervention (PT)  3 days  -AP     Row Name 10/08/20 1330          Vital Signs    Pre Systolic BP Rehab  132  -AP     Pre Treatment Diastolic BP  76  -AP     Pretreatment Heart Rate (beats/min)  87  -AP     Pre SpO2 (%)  99  -AP     O2 Delivery Pre Treatment  room air  -AP     Pre Patient Position  Supine  -AP     Post Patient Position  Sitting  -AP     Row Name 10/08/20 1330          Positioning and Restraints    Pre-Treatment Position  in bed  -AP     Post Treatment Position  chair  -AP     In Chair  reclined;call light within reach;encouraged to call for assist;exit alarm on;RLE elevated  -AP       User Key  (r) = Recorded By, (t) = Taken By, (c) = Cosigned By    Initials Name Provider Type    AP Linda,  ELEAZAR Chapa Physical Therapist        Outcome Measures     Row Name 10/08/20 1336          How much help from another person do you currently need...    Turning from your back to your side while in flat bed without using bedrails?  4  -AP     Moving from lying on back to sitting on the side of a flat bed without bedrails?  3  -AP     Moving to and from a bed to a chair (including a wheelchair)?  3  -AP     Standing up from a chair using your arms (e.g., wheelchair, bedside chair)?  4  -AP     Climbing 3-5 steps with a railing?  3  -AP     To walk in hospital room?  3  -AP     AM-PAC 6 Clicks Score (PT)  20  -AP     Row Name 10/08/20 1336          Functional Assessment    Outcome Measure Options  AM-PAC 6 Clicks Basic Mobility (PT)  -AP       User Key  (r) = Recorded By, (t) = Taken By, (c) = Cosigned By    Initials Name Provider Type     Sammi Linda PT Physical Therapist        Physical Therapy Education                 Title: PT OT SLP Therapies (Done)     Topic: Physical Therapy (Done)     Point: Mobility training (Done)     Learning Progress Summary           Patient Acceptance, E,D, VU,DU by  at 10/8/2020 1337                   Point: Home exercise program (Done)     Learning Progress Summary           Patient Acceptance, E,D, VU,DU by  at 10/8/2020 1337                   Point: Body mechanics (Done)     Learning Progress Summary           Patient Acceptance, E,D, VU,DU by  at 10/8/2020 1337                   Point: Precautions (Done)     Learning Progress Summary           Patient Acceptance, E,D, VU,DU by  at 10/8/2020 1337                               User Key     Initials Effective Dates Name Provider Type [facility] 09/24/20 -  Sammi Linda PT Physical Therapist PT              PT Recommendation and Plan  Planned Therapy Interventions (PT): balance training, bed mobility training, gait training, home exercise program, ROM (range of motion), stair training, strengthening, stretching,  transfer training  Plan of Care Reviewed With: patient  Progress: improving  Outcome Summary: Pt is 69 yo f s/p Rt TKA 10/08/2020. PMH significant for arthritis, DM, elevated cholesterol, HLD, and HTN. She presents to therapy very motivated to move and get out of bed, completing most bed mobility activites with CGA requiring only min A to move her Rt leg to EOB at one point. Sit to stnad transfers completed with SBA and use of FWW, pt amb approx 70 ft in fuller with FWW and CGA throughout. Her gait pattern was slowed and demonstrated B decreased heel strike but no severe gait deficits were identified. She assume a typical reciprocal gait pattern with only mild weight shifting deficits onto the Rt side towards beginning of treatment. She will benefit from skilled therapy services while admitted to improve listed gait deficits, increase AROM of the RLE and improve safety with functional trasfers in order to return home independently. She demonstrates great safety skills and anticipate d/c to home with HH at this time, will continue to monitor.     Time Calculation:   PT Charges     Row Name 10/08/20 1338             Time Calculation    Start Time  1241  -AP      Stop Time  1321  -AP      Time Calculation (min)  40 min  -AP      PT Received On  10/08/20  -AP      PT - Next Appointment  10/09/20  -AP      PT Goal Re-Cert Due Date  10/11/20  -AP         Time Calculation- PT    Total Timed Code Minutes- PT  27 minute(s)  -AP        User Key  (r) = Recorded By, (t) = Taken By, (c) = Cosigned By    Initials Name Provider Type    AP Sammi Linda, PT Physical Therapist        Therapy Charges for Today     Code Description Service Date Service Provider Modifiers Qty    14890634969 HC PT EVAL LOW COMPLEXITY 2 10/8/2020 Sammi Linda, PT GP 1    17665540446 HC PT THERAPEUTIC ACT EA 15 MIN 10/8/2020 Sammi Linda, PT GP 1    70877870467 HC PT THER PROC GROUP 10/8/2020 Sammi Linda, PT GP 1          PT G-Codes  Outcome Measure  Options: AM-[facility] 6 Clicks Basic Mobility (PT)  -[facility] 6 Clicks Score (PT): 20    Sammi Linda PT  10/8/2020

## 2020-10-08 NOTE — ANESTHESIA PROCEDURE NOTES
Peripheral Block      Patient location during procedure: pre-op  Reason for block: at surgeon's request and post-op pain management  Performed by  Anesthesiologist: Alonso Ortega MD  Preanesthetic Checklist  Completed: patient identified, site marked, surgical consent, pre-op evaluation, timeout performed, IV checked, risks and benefits discussed and monitors and equipment checked  Prep:  Pt Position: supine  Sterile barriers:cap, gloves and mask  Prep: ChloraPrep  Patient monitoring: blood pressure monitoring, continuous pulse oximetry and EKG  Procedure  Sedation:yes    Guidance:ultrasound guided  ULTRASOUND INTERPRETATION. Using ultrasound guidance a 22 G gauge needle was placed in close proximity to the nerve, at which point, under ultrasound guidance anesthetic was injected in the area of the nerve and spread of the anesthesia was seen on ultrasound in close proximity thereto.  There were no abnormalities seen on ultrasound; a digital image was taken; and the patient tolerated the procedure with no complications. Images:still images obtained    Laterality:right  Block Type:adductor canal block and femoral  Injection Technique:single-shot  Needle Type:echogenic  Needle Gauge:22 G      Medications Used: ropivacaine (NAROPIN) 0.2 % injection, 20 mL  lidocaine-EPINEPHrine (XYLOCAINE W/EPI) 2 %-1:112269 injection, 5 mL  Med admintered at 10/8/2020 6:52 AM      Post Assessment  Injection Assessment: negative aspiration for heme, no paresthesia on injection and incremental injection  Patient Tolerance:comfortable throughout block  Complications:no

## 2020-10-08 NOTE — ANESTHESIA PREPROCEDURE EVALUATION
Anesthesia Evaluation     Patient summary reviewed and Nursing notes reviewed   no history of anesthetic complications:  NPO Solid Status: > 6 hours  NPO Liquid Status: > 6 hours           Airway   Mallampati: II  TM distance: >3 FB  Neck ROM: full  no difficulty expected and No difficulty expected  Dental - normal exam   (+) lower dentures and upper dentures    Pulmonary - negative pulmonary ROS and normal exam    breath sounds clear to auscultation  (-) rhonchi, decreased breath sounds, wheezes, rales, stridor  Cardiovascular - normal exam    NYHA Classification: I  Rhythm: regular  Rate: normal    (+) hypertension, hyperlipidemia,   (-) murmur, weak pulses, friction rub, systolic click, carotid bruits, JVD, peripheral edema      Neuro/Psych- negative ROS  GI/Hepatic/Renal/Endo    (+)   diabetes mellitus type 2 well controlled,     Musculoskeletal     Abdominal  - normal exam    Abdomen: soft.   Substance History - negative use     OB/GYN negative ob/gyn ROS         Other   arthritis,                      Anesthesia Plan    ASA 2     general with block     intravenous induction     Anesthetic plan, all risks, benefits, and alternatives have been provided, discussed and informed consent has been obtained with: patient.

## 2020-10-08 NOTE — SIGNIFICANT NOTE
Called out to waiting room and no response.  Called and left message on friend (Kai)  answering machine, that pt was doing well and would be going to room

## 2020-10-08 NOTE — ANESTHESIA PROCEDURE NOTES
Airway  Urgency: elective    Date/Time: 10/8/2020 7:18 AM  Airway not difficult    General Information and Staff    Patient location during procedure: OR  Anesthesiologist: Alonso Ortega MD  CRNA: Babatunde Lema CRNA    Indications and Patient Condition  Indications for airway management: airway protection    Preoxygenated: yes  Mask difficulty assessment: 1 - vent by mask    Final Airway Details  Final airway type: supraglottic airway      Successful airway: LMA  Size 4    Number of attempts at approach: 1  Assessment: lips, teeth, and gum same as pre-op

## 2020-10-08 NOTE — OP NOTE
Orthopaedic Surgery Operative Note    Patient Name:  Vanessa Dorsey  YOB: 1950  Age: 70 y.o.  Medical Records Number:  [id number]    Date of Procedure:  10/8/2020    Pre-operative Diagnosis:  Primary Osteoarthritis Right Knee    Post-operative Diagnosis:  Primary Osteoarthritis Right Knee    Procedure Performed:  Right Total Knee Arthroplasty    Implants:  Biomet Vanguard TKA, 62.5 Femoral Component, 67 Tibial Tray with a 40 mm Modular Stem, 31 3-Peg Patella, 10 Posterior Lipped Polyethylene Insert    Surgeon:  Koko Small M.D.    Assistant: Alize Tarango (who was present during the critical portions of the case, thereby decreasing operative time and patient morbidity)    Anesthetic Type:  General    Estimated Blood Loss:  250cc's    Specimens: * No orders in the log *    No Complications      Indications for Procedure:  Vanessa Dorsey is a 70 y.o. female suffering from end stage degenerative changes in the right knee.  The patients pain is becoming disabling, despite extensive conservative care, including NSAIDS, therapy and injections.  The risks, benefits and alternatives were discussed and the patient wishes to proceed with right total knee arthroplasty.      Procedure Performed:    After informed consent was obtained, the correct patient identified, the correct operative side marked by the operative surgeon and pre-operative IV Kefzol given (prior to tourniquet inflation), the patient was taken to the operating room and placed supine on the operating table.  After general anesthesia was induced, a surgical time out was performed and 1 gm of Tranxemic Acid given, a well padded tourniquet was placed and the patient's right lower extremity was prepped with ChloraPrep and draped in a sterile fashion.    A midline incision was made overlying the right knee and we sharply dissected down to expose the parapatellar retinaculum.  A muscle sparing, mid-vastus parapatellar arthrotomy was  performed and we elevated the soft tissue both medially and laterally.  The menisci and ACL were excised.  We everted the patella and measured this to be 22 mm and we removed 7 mm of bone down to 15 mm.  We measured the patella to be 31 and drilled our three drill holes.  We protected the patella with the patella protector and turned our attention to the femur and the tibia.    We drilled drill holes into the femoral and the tibial intramedullary canals and proceeded to irrigate the canals to remove the fatty marrow.  We used the intramedullary sid and the 5 degree valgus cut block to make our distal femoral cut.  Once we had a smooth surface, we measured the femur to be 62.5.  We assured we had rotational alignment using the epicondylar axis, then we used the four-in-one cut block to make our anterior, posterior, anterior and posterior chamfer cuts.  We placed our femoral trial, had excellent fit, and extended the knee and marked Malverne's line on the tibia.      We then turned our attention to the tibia, where we irrigated the canal again.  We used the intramedullary sid and the 3 degree posterior sloped cut block to remove 2 mm of bone from the effected side of the tibia.  Prior to making our cut, we assured we had rotational alignment using the external guide and Malverne's line.  Once we had a smooth surface, we measured the tibia to be 67.  We then removed soft tissue and bony debris from the posterior aspect of the knee.    We placed our trial implants and had excellent fit, range of motion, stability and ligament balance, throughout a complete arc of motion with a 10 posterior lipped polyethylene liner.  We removed our trial implants, punched the tibial keel, copiously irrigated the knee, then cemented our implants in place using Biomet cement.  Once the cement cured, we trialed again with the 10 and 12 AS,standard and posterior lipped polyethylene liners.  The 10 lipped gave us the best range of motion,  "stability and ligament balance, throughout a complete arc of motion.  We removed the trials, copiously irrigated the knee, gave IV antibiotics and local anesthetic, then placed our permanent polyethylene liner.  We placed a 1/8\" hemovac drain, then closed the arthrotomy with #1 Vicryl pop-off sutures.  The subcutaneous tissue was closed with 2-0 vicryl pop-off sutures.  The skin was closed with staples.  We placed a sterile dressing of Xeroform, 4x4's, abdominal pads, cast padding and an Ace Wrap.  All sponge and needle counts were correct.  The patient was then awakened from general anesthesia and taken to the recovery room in stable condition.    The patient will be started on Aspirin 325 mg twice daily for DVT prophylaxis.  IV antibiotics will be discontinued within 24 hours of surgery.  Immediately prior to surgery, there were no acute Thromboembolic nor Cardiovascular risk factors.  An updated Medical Reconciliation form is on the chart.    Koko Tarango PA-C  [facility]  [address] [place] [address]  [phone]    10/8/2020  "

## 2020-10-09 VITALS
HEART RATE: 64 BPM | HEIGHT: 65 IN | TEMPERATURE: 97 F | BODY MASS INDEX: 22.74 KG/M2 | RESPIRATION RATE: 16 BRPM | DIASTOLIC BLOOD PRESSURE: 72 MMHG | SYSTOLIC BLOOD PRESSURE: 133 MMHG | OXYGEN SATURATION: 99 % | WEIGHT: 136.47 LBS

## 2020-10-09 LAB
ANION GAP SERPL CALCULATED.3IONS-SCNC: 8.4 MMOL/L (ref 5–15)
BUN SERPL-MCNC: 19 MG/DL (ref 8–23)
BUN/CREAT SERPL: 26.8 (ref 7–25)
CALCIUM SPEC-SCNC: 8.4 MG/DL (ref 8.6–10.5)
CHLORIDE SERPL-SCNC: 97 MMOL/L (ref 98–107)
CO2 SERPL-SCNC: 24.6 MMOL/L (ref 22–29)
CREAT SERPL-MCNC: 0.71 MG/DL (ref 0.57–1)
DEPRECATED RDW RBC AUTO: 40.3 FL (ref 37–54)
ERYTHROCYTE [DISTWIDTH] IN BLOOD BY AUTOMATED COUNT: 13.2 % (ref 12.3–15.4)
GFR SERPL CREATININE-BSD FRML MDRD: 82 ML/MIN/1.73
GLUCOSE BLDC GLUCOMTR-MCNC: 153 MG/DL (ref 70–130)
GLUCOSE BLDC GLUCOMTR-MCNC: 157 MG/DL (ref 70–130)
GLUCOSE SERPL-MCNC: 149 MG/DL (ref 65–99)
HCT VFR BLD AUTO: 26.1 % (ref 34–46.6)
HGB BLD-MCNC: 8.8 G/DL (ref 12–15.9)
MCH RBC QN AUTO: 28.2 PG (ref 26.6–33)
MCHC RBC AUTO-ENTMCNC: 33.7 G/DL (ref 31.5–35.7)
MCV RBC AUTO: 83.7 FL (ref 79–97)
PLATELET # BLD AUTO: 246 10*3/MM3 (ref 140–450)
PMV BLD AUTO: 9.9 FL (ref 6–12)
POTASSIUM SERPL-SCNC: 4.3 MMOL/L (ref 3.5–5.2)
RBC # BLD AUTO: 3.12 10*6/MM3 (ref 3.77–5.28)
SODIUM SERPL-SCNC: 130 MMOL/L (ref 136–145)
WBC # BLD AUTO: 10.75 10*3/MM3 (ref 3.4–10.8)

## 2020-10-09 PROCEDURE — 80048 BASIC METABOLIC PNL TOTAL CA: CPT | Performed by: ORTHOPAEDIC SURGERY

## 2020-10-09 PROCEDURE — 97530 THERAPEUTIC ACTIVITIES: CPT

## 2020-10-09 PROCEDURE — 82962 GLUCOSE BLOOD TEST: CPT

## 2020-10-09 PROCEDURE — 63710000001 INSULIN LISPRO (HUMAN) PER 5 UNITS: Performed by: ORTHOPAEDIC SURGERY

## 2020-10-09 PROCEDURE — 25010000002 KETOROLAC TROMETHAMINE PER 15 MG: Performed by: ORTHOPAEDIC SURGERY

## 2020-10-09 PROCEDURE — 85027 COMPLETE CBC AUTOMATED: CPT | Performed by: ORTHOPAEDIC SURGERY

## 2020-10-09 RX ORDER — OXYCODONE HYDROCHLORIDE AND ACETAMINOPHEN 5; 325 MG/1; MG/1
1-2 TABLET ORAL EVERY 4 HOURS PRN
Qty: 84 TABLET | Refills: 0 | Status: SHIPPED | OUTPATIENT
Start: 2020-10-09 | End: 2020-11-02 | Stop reason: SDUPTHER

## 2020-10-09 RX ORDER — PSEUDOEPHEDRINE HCL 30 MG
100 TABLET ORAL 2 TIMES DAILY PRN
Qty: 30 EACH | Refills: 1 | Status: SHIPPED | OUTPATIENT
Start: 2020-10-09

## 2020-10-09 RX ADMIN — FERROUS SULFATE TAB 325 MG (65 MG ELEMENTAL FE) 325 MG: 325 (65 FE) TAB at 08:05

## 2020-10-09 RX ADMIN — INSULIN LISPRO 2 UNITS: 100 INJECTION, SOLUTION INTRAVENOUS; SUBCUTANEOUS at 08:05

## 2020-10-09 RX ADMIN — AMLODIPINE BESYLATE 5 MG: 5 TABLET ORAL at 08:05

## 2020-10-09 RX ADMIN — ASPIRIN 325 MG: 325 TABLET, COATED ORAL at 08:05

## 2020-10-09 RX ADMIN — KETOROLAC TROMETHAMINE 15 MG: 15 INJECTION, SOLUTION INTRAMUSCULAR; INTRAVENOUS at 15:43

## 2020-10-09 RX ADMIN — LEVOTHYROXINE SODIUM 50 MCG: 50 TABLET ORAL at 05:58

## 2020-10-09 RX ADMIN — OXYCODONE HYDROCHLORIDE AND ACETAMINOPHEN 2 TABLET: 5; 325 TABLET ORAL at 01:49

## 2020-10-09 RX ADMIN — OXYCODONE HYDROCHLORIDE AND ACETAMINOPHEN 1 TABLET: 5; 325 TABLET ORAL at 14:31

## 2020-10-09 RX ADMIN — MUPIROCIN 1 APPLICATION: 20 OINTMENT TOPICAL at 08:08

## 2020-10-09 RX ADMIN — FLUOXETINE HYDROCHLORIDE 40 MG: 20 CAPSULE ORAL at 08:05

## 2020-10-09 RX ADMIN — INSULIN LISPRO 2 UNITS: 100 INJECTION, SOLUTION INTRAVENOUS; SUBCUTANEOUS at 11:46

## 2020-10-09 RX ADMIN — ATORVASTATIN CALCIUM 10 MG: 20 TABLET, FILM COATED ORAL at 08:05

## 2020-10-09 RX ADMIN — OXYCODONE HYDROCHLORIDE AND ACETAMINOPHEN 1 TABLET: 5; 325 TABLET ORAL at 08:18

## 2020-10-09 RX ADMIN — METFORMIN HYDROCHLORIDE 1000 MG: 1000 TABLET ORAL at 08:05

## 2020-10-09 RX ADMIN — METOPROLOL SUCCINATE 25 MG: 25 TABLET, EXTENDED RELEASE ORAL at 08:05

## 2020-10-09 RX ADMIN — Medication 3 ML: at 08:08

## 2020-10-09 NOTE — PROGRESS NOTES
Continued Stay Note  [facility]     Patient Name: Vanessa Dorsey  MRN: [id number]  Today's Date: 10/9/2020    Admit Date: 10/8/2020    Discharge Plan     Row Name 10/09/20 1312       Plan    Plan  [facility]    Provided Post Acute Provider List?  Yes    Post Acute Provider List  Home Health    Provided Post Acute Provider Quality & Resource List?  Yes    Post Acute Provider Quality and Resource List  Home Health    Delivered To  Patient    Method of Delivery  Telephone    Patient/Family in Agreement with Plan  yes    Plan Comments  Spoke with pt, verified correct information on facesheet and explained the role of CCP. Pt would like to d/c home with [facility], referral sent in Epic to [facility]. Plan will be to d/c home with [facility] and family support. No other needs identified.    Final Discharge Disposition Code  06 - home with home health care    Final Note  Pt to d/c home with [facility], notified Sara with [facility] of d/c orders. (MC bundle)        Discharge Codes    No documentation.       Expected Discharge Date and Time     Expected Discharge Date Expected Discharge Time    Oct 9, 2020             Mar Munoz RN

## 2020-10-09 NOTE — DISCHARGE SUMMARY
Orthopaedic Surgery Discharge Summary    Patient Name:  Vanessa Dorsey  YOB: 1950  Age: 70 y.o.  Medical Records Number:  [id number]    Date of Admission:  10/8/2020  Date of Discharge:  10/9/2020    Primary Discharge Diagnosis:  Primary osteoarthritis of right knee [M17.11]    Secondary Discharge Diagnosis:    Problem List Items Addressed This Visit        Musculoskeletal and Integument    Primary osteoarthritis of right knee - Primary    Relevant Medications    oxyCODONE-acetaminophen (PERCOCET) 5-325 MG per tablet          Procedures Performed:  Right Total Knee Arthroplasty      Hospital Course:    Vanessa Dorsey is a 70 y.o.  female who underwent successful right tka on 10/8/2020.  Vanessa Dorsey was started on Aspirin 325 mg po twice daily post-operatively for DVT prophylaxis.  On post-op day 1 the patients dressing was changed and their incision was clean, with no signs of infection and their calf was soft, with no signs of DVT.  The patient progressed well with physical therapy and the patients hemoglobin remained stable. On post-operative day 1 the patient was felt ready for discharge.     Vitals:  Vitals:    10/08/20 1456 10/08/20 1900 10/08/20 2300 10/09/20 0300   BP: 145/72 123/77 130/74 116/64   BP Location: Left leg Right arm Left arm Right arm   Patient Position: Sitting Sitting Lying Lying   Pulse: 97 84 77 78   Resp: 16 16 16 16   Temp: 97.5 °F (36.4 °C) 96.6 °F (35.9 °C) 97.3 °F (36.3 °C) 97.3 °F (36.3 °C)   TempSrc: Skin Skin Skin Skin   SpO2: 95% 100% 95% 99%   Weight:       Height:           Discharge Medications:      Discharge Medications      New Medications      Instructions Start Date   aspirin 325 MG EC tablet   325 mg, Oral, 2 Times Daily With Meals      docusate sodium 100 MG capsule   100 mg, Oral, 2 Times Daily PRN      oxyCODONE-acetaminophen 5-325 MG per tablet  Commonly known as: PERCOCET   1-2 tablets, Oral, Every 4 Hours PRN         Continue These  Medications      Instructions Start Date   amLODIPine 5 MG tablet  Commonly known as: NORVASC   5 mg, Oral, Daily      Centrum Adults tablet   1 tablet, Oral, Daily, HOLD FOR SURGERY      FLINTSTONES MULTIVITAMIN PO   2 tablets, Oral, Daily, HOLD FOR SURGERY       FLUoxetine 20 MG capsule  Commonly known as: PROzac   40 mg, Oral, Daily      levothyroxine 100 MCG tablet  Commonly known as: SYNTHROID, LEVOTHROID   50 mcg, Oral, Daily      lovastatin 40 MG tablet  Commonly known as: MEVACOR   40 mg, Oral, Daily      metFORMIN 1000 MG tablet  Commonly known as: GLUCOPHAGE   1,000 mg, Oral, 2 Times Daily      metoprolol succinate XL 25 MG 24 hr tablet  Commonly known as: TOPROL-XL   25 mg, Oral, Daily         Stop These Medications    Chlorhexidine Gluconate 2 % pads     meloxicam 15 MG tablet  Commonly known as: MOBIC     mupirocin 2 % ointment  Commonly known as: BACTROBAN            Pain Medications:  Percocet 5/325 mg 1-2 po q 4-6 hours prn pain    DVT Prophylaxis:  Enteric Coated Aspirin 325 mg po twice daily for 2 weeks, then one daily for 4 weeks      Total Knee Joint Replacement Discharge Instructions:    I. ACTIVITIES:  1. Exercises:  ? Complete exercise program as taught by the hospital physical therapist 2 times per day  ? Exercise program will be advanced by the physical therapist  ? During the day be up ambulating every 2 hours (while awake) for short distances  ? Complete the ankle pump exercises at least 10 times per hour (while awake)  ? Elevate legs most of the day the first week post operatively and thereafter elevate legs when in bed and for at least 30 minutes during the day. Caution must be taken to avoid pillow placement under the bend of the knee as this can led to flexion contractures of the knee.  ? Use cold packs 20-30 minutes approximately 5 times per day. This should be done before and after completing your exercises and at any time you are experiencing pain/ stiffness in your operative  extremity.      2. Activities of Daily Living:  ? No tub baths, hot tubs, or swimming pools for 4 weeks  ? May shower and let water run over the incision on post-operative day #5 if no drainage. Do not scrub or rub the incision. Simply let the water run over the incision and pat dry.    II. Restrictions  ? Do not cross legs or kneel  ? Your surgeon will discuss with you when you will be able to drive again.  ? Weight bearing is as tolerated  ? First week stay inside on even terrain. May go up and down stairs one stair at a time utilizing the hand rail  ? After one week, you may venture outside.    III. Precautions:  ? Everyone that comes near you should wash their hands  ? No elective dental, genital-urinary, or colon procedures or surgical procedures for 12 weeks after surgery unless absolutely necessary.  ?  If dental work or surgical procedure is deemed absolutely necessary, you will need to contact your surgeon as you will need to take antibiotics 1 hour prior to any dental work (including teeth cleanings).  ? Please discuss with your surgeon prophylactic antibiotics as the length of time this intervention will be necessary for you varies with each patient’s health history and situation.  ? Avoid sick people. If you must be around someone who is ill, they should wear a mask.  ? Avoid visits to the Emergency Room or Urgent Care unless you are having a life threatening event.   ? If ordered stockings are to be placed on in the morning and removed at night. Monitor the stockings to ensure that any swelling is not causing the stockings to become too tight. In this case, remove stockings immediately.    IV. INCISION CARE:  ? Wash your hands prior to dressing changes  ? Change the dressing as needed to keep incision clean and dry. Utilize dry gauze and paper tape. Avoid touching the side of the gauze that goes against the incision with your hands.  ? No creams or ointments to the incision  ? May remove dressing once  the incision is free of drainage  ? Do not touch or pick at the incision  ? Check incision every day and notify surgeon immediately if any of the following signs or symptoms are noted:  o Increase in redness  o Increase in swelling around the incision and of the entire extremity  o Increase in pain  o Drainage oozing from the incision  o Pulling apart of the edges of the incision  o Increase in overall body temperature (greater than 100.5 degrees)  ? Your surgeon will instruct you regarding suture or staple removal    V. Medications:   1. Anticoagulants: You will be discharged on an anticoagulant. This is a prophylactic medication that helps prevent blood clots during your post-operative period. The type and length of dosage varies based on your individual needs, procedure performed, and surgeon’s preference.  ? While taking the anticoagulant, you should avoid taking any additional aspirin, ibuprofen (Advil or Motrin), Aleve (Naprosyn) or other non-steroidal anti-inflammatory medications.   ? Notify surgeon immediately if any robbie bleeding is noted in the urine, stool, emesis, or from the nose or the incision. Blood in the stool will often appear as black rather than red. Blood in urine may appear as pink. Blood in emesis may appear as brown/black like coffee grounds.  ? You will need to apply pressure for longer periods of time to any cuts or abrasions to stop bleeding  ? Avoid alcohol while taking anticoagulants    2. Stool Softeners: You will be at greater risk of constipation after surgery due to being less mobile and the pain medications.   ? Take stool softeners as instructed by your surgeon while on pain medications. Over the counter Colace 100 mg 1-2 capsules twice daily.   ? If stools become too loose or too frequent, please decreases the dosage or stop the stool softener.  ? If constipation occurs despite use of stool softeners, you are to continue the stool softeners and add a laxative (Milk of Magnesia  1 ounce daily as needed)  ? Drink plenty of fluids, and eat fruits and vegetables during your recovery time    3. Pain Medications utilized after surgery are narcotics and the law requires that the following information be given to all patients that are prescribed narcotics:  ? CLASSIFICATION: Pain medications are called Opioids and are narcotics  ? LEGALITIES: It is illegal to share narcotics with others and to drive within 24 hours of taking narcotics  ? POTENTIAL SIDE EFFECTS: Potential side effects of opioids include: nausea, vomiting, itching, dizziness, drowsiness, dry mouth, constipation, and difficulty urinating.  ? POTENTIAL ADVERSE EFFECTS:   o Opioid tolerance can develop with use of pain medications and this simply means that it requires more and more of the medication to control pain; however, this is seen more in patients that use opioids for longer periods of time.  o Opioid dependence can develop with use of Opioids and this simply means that to stop the medication can cause withdrawal symptoms; however, this is seen with patients that use Opioids for longer periods of time.  o Opioid addiction can develop with use of Opioids and the incidence of this is very unlikely in patients who take the medications as ordered and stop the medications as instructed.  o Opioid overdose can be dangerous, but is unlikely when the medication is taken as ordered and stopped when ordered. It is important not to mix opioids with alcohol or with and type of sedative such as Benadryl as this can lead to over sedation and respiratory difficulty.  ? DOSAGE:   o Pain medications will need to be taken consistently for the first week to decrease pain and promote adequate pain relief and participation in physical therapy.  o After the initial surgical pain begins to resolve, you may begin to decrease the pain medication. By the end of 6-8 weeks, you should be off of pain medications.  o Refills will not be given by the office  during evening hours, on weekends, or after 6-8 weeks post-op.  o To seek refills on pain medications during the initial 6 week post-operative period, you must call the office 48 hours in advance to request the refill. The office will then notify you when to  the prescription. DO NOT wait until you are out of the medication to request a refill.    V. FOLLOW-UP VISITS:  ? You will need to follow up in the office with your surgeon in 3 weeks. Please call this number [phone] to schedule this appointment.  If you have any concerns or suspected complications prior to your follow up visit, please call your surgeons office. Do not wait until your appointment time if you suspect complications. These will need to be addressed in the office promptly.    Koko Tarango PA-C  [facility]  [address] [place] [address]  [phone]    10/9/2020    CC:Provider, No Known:Koko Small MD

## 2020-10-09 NOTE — PLAN OF CARE
Problem: Adult Inpatient Plan of Care  Goal: Plan of Care Review  Recent Flowsheet Documentation  Taken 10/9/2020 0905 by Vanessa Rowe PTA  Progress: improving  Plan of Care Reviewed With: patient  Outcome Summary: Pt continues to improve w/ PT ambulating 400' req SV/fww. Pt cued for heel strike and exhibiting a step through pattern. No safety issues noted. Pt would benefit from HH PT to improve her strength, mobility, and endurance.    Patient was wearing a face mask during this therapy encounter. Therapist used appropriate personal protective equipment including eye protection, mask, and gloves.  Mask used was standard procedure mask. Appropriate PPE was worn during the entire therapy session. Hand hygiene was completed before and after therapy session. Patient is not in enhanced droplet precautions.

## 2020-10-09 NOTE — DISCHARGE SUMMARY
Orthopaedic Surgery Discharge Summary    Patient Name:  Vanessa Dorsey  YOB: 1950  Age: 70 y.o.  Medical Records Number:  [id number]    Date of Admission:  10/8/2020  Date of Discharge:  10/9/2020    Primary Discharge Diagnosis:  Primary osteoarthritis of right knee [M17.11]    Secondary Discharge Diagnosis:    Problem List Items Addressed This Visit     None          Procedures Performed:  Right Total Knee Arthroplasty      Hospital Course:    Vanessa Dorsey is a 70 y.o.  female who underwent successful right tka on 10/8/2020.  Vanessa Dorsey was started on Aspirin 325 mg po twice daily post-operatively for DVT prophylaxis.  On post-op day 1 the patients dressing was changed and their incision was clean, with no signs of infection and their calf was soft, with no signs of DVT.  The patient progressed well with physical therapy and the patients hemoglobin remained stable. On post-operative day 1 the patient was felt ready for discharge.     Vitals:  Vitals:    10/08/20 1456 10/08/20 1900 10/08/20 2300 10/09/20 0300   BP: 145/72 123/77 130/74 116/64   BP Location: Left leg Right arm Left arm Right arm   Patient Position: Sitting Sitting Lying Lying   Pulse: 97 84 77 78   Resp: 16 16 16 16   Temp: 97.5 °F (36.4 °C) 96.6 °F (35.9 °C) 97.3 °F (36.3 °C) 97.3 °F (36.3 °C)   TempSrc: Skin Skin Skin Skin   SpO2: 95% 100% 95% 99%   Weight:       Height:           Discharge Medications:      Discharge Medications      ASK your doctor about these medications      Instructions Start Date   amLODIPine 5 MG tablet  Commonly known as: NORVASC   5 mg, Oral, Daily      Centrum Adults tablet   1 tablet, Oral, Daily, HOLD FOR SURGERY      Chlorhexidine Gluconate 2 % pads   Apply externally, AS DIRECTED PAT       FLINTSTONES MULTIVITAMIN PO   2 tablets, Oral, Daily, HOLD FOR SURGERY       FLUoxetine 20 MG capsule  Commonly known as: PROzac   40 mg, Oral, Daily      levothyroxine 100 MCG tablet  Commonly known  as: SYNTHROID, LEVOTHROID   50 mcg, Oral, Daily      lovastatin 40 MG tablet  Commonly known as: MEVACOR   40 mg, Oral, Daily      meloxicam 15 MG tablet  Commonly known as: MOBIC   15 mg, Oral, Daily, HOLD FOR SURGERY      metFORMIN 1000 MG tablet  Commonly known as: GLUCOPHAGE   1,000 mg, Oral, 2 Times Daily      metoprolol succinate XL 25 MG 24 hr tablet  Commonly known as: TOPROL-XL   25 mg, Oral, Daily      mupirocin 2 % ointment  Commonly known as: BACTROBAN   Topical, 3 Times Daily, AS DIRECTED PAT              Pain Medications:  Percocet 5/325 mg 1-2 po q 4-6 hours prn pain    DVT Prophylaxis:  Enteric Coated Aspirin 325 mg po twice daily for 2 weeks, then one daily for 4 weeks      Total Knee Joint Replacement Discharge Instructions:    I. ACTIVITIES:  1. Exercises:  ? Complete exercise program as taught by the hospital physical therapist 2 times per day  ? Exercise program will be advanced by the physical therapist  ? During the day be up ambulating every 2 hours (while awake) for short distances  ? Complete the ankle pump exercises at least 10 times per hour (while awake)  ? Elevate legs most of the day the first week post operatively and thereafter elevate legs when in bed and for at least 30 minutes during the day. Caution must be taken to avoid pillow placement under the bend of the knee as this can led to flexion contractures of the knee.  ? Use cold packs 20-30 minutes approximately 5 times per day. This should be done before and after completing your exercises and at any time you are experiencing pain/ stiffness in your operative extremity.      2. Activities of Daily Living:  ? No tub baths, hot tubs, or swimming pools for 4 weeks  ? May shower and let water run over the incision on post-operative day #5 if no drainage. Do not scrub or rub the incision. Simply let the water run over the incision and pat dry.    II. Restrictions  ? Do not cross legs or kneel  ? Your surgeon will discuss with you  when you will be able to drive again.  ? Weight bearing is as tolerated  ? First week stay inside on even terrain. May go up and down stairs one stair at a time utilizing the hand rail  ? After one week, you may venture outside.    III. Precautions:  ? Everyone that comes near you should wash their hands  ? No elective dental, genital-urinary, or colon procedures or surgical procedures for 12 weeks after surgery unless absolutely necessary.  ?  If dental work or surgical procedure is deemed absolutely necessary, you will need to contact your surgeon as you will need to take antibiotics 1 hour prior to any dental work (including teeth cleanings).  ? Please discuss with your surgeon prophylactic antibiotics as the length of time this intervention will be necessary for you varies with each patient’s health history and situation.  ? Avoid sick people. If you must be around someone who is ill, they should wear a mask.  ? Avoid visits to the Emergency Room or Urgent Care unless you are having a life threatening event.   ? If ordered stockings are to be placed on in the morning and removed at night. Monitor the stockings to ensure that any swelling is not causing the stockings to become too tight. In this case, remove stockings immediately.    IV. INCISION CARE:  ? Wash your hands prior to dressing changes  ? Change the dressing as needed to keep incision clean and dry. Utilize dry gauze and paper tape. Avoid touching the side of the gauze that goes against the incision with your hands.  ? No creams or ointments to the incision  ? May remove dressing once the incision is free of drainage  ? Do not touch or pick at the incision  ? Check incision every day and notify surgeon immediately if any of the following signs or symptoms are noted:  o Increase in redness  o Increase in swelling around the incision and of the entire extremity  o Increase in pain  o Drainage oozing from the incision  o Pulling apart of the edges of the  incision  o Increase in overall body temperature (greater than 100.5 degrees)  ? Your surgeon will instruct you regarding suture or staple removal    V. Medications:   1. Anticoagulants: You will be discharged on an anticoagulant. This is a prophylactic medication that helps prevent blood clots during your post-operative period. The type and length of dosage varies based on your individual needs, procedure performed, and surgeon’s preference.  ? While taking the anticoagulant, you should avoid taking any additional aspirin, ibuprofen (Advil or Motrin), Aleve (Naprosyn) or other non-steroidal anti-inflammatory medications.   ? Notify surgeon immediately if any robbie bleeding is noted in the urine, stool, emesis, or from the nose or the incision. Blood in the stool will often appear as black rather than red. Blood in urine may appear as pink. Blood in emesis may appear as brown/black like coffee grounds.  ? You will need to apply pressure for longer periods of time to any cuts or abrasions to stop bleeding  ? Avoid alcohol while taking anticoagulants    2. Stool Softeners: You will be at greater risk of constipation after surgery due to being less mobile and the pain medications.   ? Take stool softeners as instructed by your surgeon while on pain medications. Over the counter Colace 100 mg 1-2 capsules twice daily.   ? If stools become too loose or too frequent, please decreases the dosage or stop the stool softener.  ? If constipation occurs despite use of stool softeners, you are to continue the stool softeners and add a laxative (Milk of Magnesia 1 ounce daily as needed)  ? Drink plenty of fluids, and eat fruits and vegetables during your recovery time    3. Pain Medications utilized after surgery are narcotics and the law requires that the following information be given to all patients that are prescribed narcotics:  ? CLASSIFICATION: Pain medications are called Opioids and are narcotics  ? LEGALITIES: It is  illegal to share narcotics with others and to drive within 24 hours of taking narcotics  ? POTENTIAL SIDE EFFECTS: Potential side effects of opioids include: nausea, vomiting, itching, dizziness, drowsiness, dry mouth, constipation, and difficulty urinating.  ? POTENTIAL ADVERSE EFFECTS:   o Opioid tolerance can develop with use of pain medications and this simply means that it requires more and more of the medication to control pain; however, this is seen more in patients that use opioids for longer periods of time.  o Opioid dependence can develop with use of Opioids and this simply means that to stop the medication can cause withdrawal symptoms; however, this is seen with patients that use Opioids for longer periods of time.  o Opioid addiction can develop with use of Opioids and the incidence of this is very unlikely in patients who take the medications as ordered and stop the medications as instructed.  o Opioid overdose can be dangerous, but is unlikely when the medication is taken as ordered and stopped when ordered. It is important not to mix opioids with alcohol or with and type of sedative such as Benadryl as this can lead to over sedation and respiratory difficulty.  ? DOSAGE:   o Pain medications will need to be taken consistently for the first week to decrease pain and promote adequate pain relief and participation in physical therapy.  o After the initial surgical pain begins to resolve, you may begin to decrease the pain medication. By the end of 6-8 weeks, you should be off of pain medications.  o Refills will not be given by the office during evening hours, on weekends, or after 6-8 weeks post-op.  o To seek refills on pain medications during the initial 6 week post-operative period, you must call the office 48 hours in advance to request the refill. The office will then notify you when to  the prescription. DO NOT wait until you are out of the medication to request a refill.    V. FOLLOW-UP  VISITS:  ? You will need to follow up in the office with your surgeon in 3 weeks. Please call this number [phone] to schedule this appointment.  If you have any concerns or suspected complications prior to your follow up visit, please call your surgeons office. Do not wait until your appointment time if you suspect complications. These will need to be addressed in the office promptly.    Koko Tarango PA-C  [facility]  [address] [place] [address]  [phone]    10/9/2020    CC:Provider, No Known:Koko Small MD

## 2020-10-09 NOTE — PLAN OF CARE
Goal Outcome Evaluation:  Plan of Care Reviewed With: patient  Progress: improving  Outcome Summary: Pt remains stable. Ambulates with walker use and 1 assist. Pain well controlled. Voiding adequately per BRP. HV inadvertantly removed, dressing changed to site x2 and is now CDI. Island dressing to incision is CDI. Education provided on diabetes management with glucose monitoring and meds. Plans to d/c home today. Will continue to monitor.

## 2020-10-09 NOTE — THERAPY TREATMENT NOTE
Patient Name: Vanessa Dorsey  : 1950    MRN: [id number]                              Today's Date: 10/9/2020       Admit Date: 10/8/2020    Visit Dx:     ICD-10-CM ICD-9-CM   1. Primary osteoarthritis of right knee  M17.11 715.16     Patient Active Problem List   Diagnosis   • Hyperglycemia   • Essential hypertension   • Type 2 diabetes mellitus with hyperglycemia (CMS/HCC)   • Hypothyroidism   • UTI (urinary tract infection)   • Dehydration   • Viral illness   • Primary osteoarthritis of right knee     Past Medical History:   Diagnosis Date   • Arthritis    • Diabetes mellitus (CMS/HCC)    • Disease of thyroid gland    • Elevated cholesterol    • Hyperlipidemia    • Hypertension    • Right knee pain      Past Surgical History:   Procedure Laterality Date   • COLONOSCOPY     • HYSTERECTOMY     • KNEE ARTHROSCOPY MENISCUS TRANSPLANT Right     AUG 2019   • LASIK Bilateral    • SHOULDER ARTHROSCOPY Bilateral    • TUBAL ABDOMINAL LIGATION       General Information     Row Name 10/09/20 0902          Physical Therapy Time and Intention    Document Type  therapy note (daily note)  -     Mode of Treatment  physical therapy  -     Row Name 10/09/20 0902          Safety Issues, Functional Mobility    Impairments Affecting Function (Mobility)  endurance/activity tolerance;strength;pain  -PH       User Key  (r) = Recorded By, (t) = Taken By, (c) = Cosigned By    Initials Name Provider Type    PH Vanessa Rowe PTA Physical Therapy Assistant        Mobility     Row Name 10/09/20 0903          Bed Mobility    Bed Mobility  supine-sit  -PH     Supine-Sit [place] (Bed Mobility)  supervision  -     Row Name 10/09/20 0903          Sit-Stand Transfer    Sit-Stand [place] (Transfers)  standby assist  -     Assistive Device (Sit-Stand Transfers)  walker, front-wheeled  -     Row Name 10/09/20 0903          Gait/Stairs (Locomotion)    [place] Level (Gait)  standby assist;verbal cues  -      Assistive Device (Gait)  walker, front-wheeled  -PH     Distance in Feet (Gait)  400' w/ step through pattern  -PH     Deviations/Abnormal Patterns (Gait)  mehrdad decreased;gait speed decreased;stride length decreased  -PH     Bilateral Gait Deviations  heel strike decreased  -PH     Right Sided Gait Deviations  weight shift ability decreased  -PH     Comment (Gait/Stairs)  Cueing for heel strike; no safety issues noted.  -PH       User Key  (r) = Recorded By, (t) = Taken By, (c) = Cosigned By    Initials Name Provider Type     Vanessa Rowe PTA Physical Therapy Assistant        Obj/Interventions     Row Name 10/09/20 0904          Range of Motion Comprehensive    Comment, General Range of Motion  5-90  -PH     Row Name 10/09/20 0904          Motor Skills    Therapeutic Exercise  knee  -PH     Row Name 10/09/20 0904          Knee (Therapeutic Exercise)    Knee (Therapeutic Exercise)  strengthening exercise  -PH     Knee Strengthening (Therapeutic Exercise)  -- R TKA protocol x 15 reps; assist to start SLR for 2 reps  -PH       User Key  (r) = Recorded By, (t) = Taken By, (c) = Cosigned By    Initials Name Provider Type     Vanessa Rowe PTA Physical Therapy Assistant        Goals/Plan    No documentation.       Clinical Impression     Row Name 10/09/20 0905          Pain    Additional Documentation  Pain Scale: Numbers Pre/Post-Treatment (Group)  -PH     Row Name 10/09/20 0905          Pain Scale: Numbers Pre/Post-Treatment    Posttreatment Pain Rating  4/10  -PH     Pain Location - Side  Right  -PH     Pain Location - Orientation  incisional  -PH     Pain Location  knee  -PH     Pain Intervention(s)  Cold pack;Repositioned;Ambulation/increased activity  -PH     Row Name 10/09/20 0905          Plan of Care Review    Plan of Care Reviewed With  patient  -PH     Progress  improving  -PH     Outcome Summary  Pt continues to improve w/ PT ambulating 400' req SV/fww. Pt cued for heel strike and  exhibiting a step through pattern. No safety issues noted. Pt would benefit from  PT to improve her strength, mobility, and endurance.  -PH     Row Name 10/09/20 0905          Positioning and Restraints    Pre-Treatment Position  in bed  -PH     Post Treatment Position  chair  -PH     In Chair  reclined;call light within reach;encouraged to call for assist;exit alarm on  -PH       User Key  (r) = Recorded By, (t) = Taken By, (c) = Cosigned By    Initials Name Provider Type     Vanessa Rowe PTA Physical Therapy Assistant        Outcome Measures     Row Name 10/09/20 0907          How much help from another person do you currently need...    Turning from your back to your side while in flat bed without using bedrails?  4  -PH     Moving from lying on back to sitting on the side of a flat bed without bedrails?  4  -PH     Moving to and from a bed to a chair (including a wheelchair)?  4  -PH     Standing up from a chair using your arms (e.g., wheelchair, bedside chair)?  4  -PH     Climbing 3-5 steps with a railing?  3  -PH     To walk in hospital room?  4  -PH     AM-PAC 6 Clicks Score (PT)  23  -PH     Row Name 10/09/20 0907          Functional Assessment    Outcome Measure Options  AM-PAC 6 Clicks Basic Mobility (PT)  -PH       User Key  (r) = Recorded By, (t) = Taken By, (c) = Cosigned By    Initials Name Provider Type     Vanessa Rowe PTA Physical Therapy Assistant        Physical Therapy Education                 Title: PT OT SLP Therapies (Done)     Topic: Physical Therapy (Done)     Point: Mobility training (Done)     Learning Progress Summary           Patient Acceptance, E,D, VU,DU by  at 10/9/2020 0907    Acceptance, E,D, VU,DU by AP at 10/8/2020 1337                   Point: Home exercise program (Done)     Learning Progress Summary           Patient Acceptance, E,D, VU,DU by  at 10/9/2020 0907    Acceptance, E,D, VU,DU by AP at 10/8/2020 1337                   Point: Body  mechanics (Done)     Learning Progress Summary           Patient Acceptance, E,D, VU,DU by  at 10/9/2020 0907    Acceptance, E,D, VU,DU by AP at 10/8/2020 1337                   Point: Precautions (Done)     Learning Progress Summary           Patient Acceptance, E,D, VU,DU by  at 10/9/2020 0907    Acceptance, E,D, VU,DU by  at 10/8/2020 1337                               User Key     Initials Effective Dates Name Provider Type Discipline     08/20/19 -  Vanessa Rowe PTA Physical Therapy Assistant PT    AP 09/24/20 -  Sammi [place] PT Physical Therapist PT              PT Recommendation and Plan     Plan of Care Reviewed With: patient  Progress: improving  Outcome Summary: Pt continues to improve w/ PT ambulating 400' req SV/fww. Pt cued for heel strike and exhibiting a step through pattern. No safety issues noted. Pt would benefit from  PT to improve her strength, mobility, and endurance.     Time Calculation:   PT Charges     Row Name 10/09/20 0908             Time Calculation    Start Time  0821  -PH      Stop Time  0852  -PH      Time Calculation (min)  31 min  -      PT Received On  10/09/20  -      PT - Next Appointment  10/10/20  -        User Key  (r) = Recorded By, (t) = Taken By, (c) = Cosigned By    Initials Name Provider Type     Vanessa Rowe PTA Physical Therapy Assistant        Therapy Charges for Today     Code Description Service Date Service Provider Modifiers Qty    [id number]  PT THERAPEUTIC ACT EA 15 MIN 10/9/2020 Vanessa Rowe PTA GP 2          PT G-Codes  Outcome Measure Options: AM-PAC 6 Clicks Basic Mobility (PT)  AM-PAC 6 Clicks Score (PT): 23    Vanessa Rowe PTA  10/9/2020

## 2020-10-09 NOTE — PROGRESS NOTES
Orthopaedic Surgery  Progress Note  10/9/2020    Patients Name:  Vanessa Dorsey  YOB: 1950  Age: 70 y.o.  Medical Records Number:  [id number]  Date of Admission: 10/8/2020    No complaints except pain    Vitals:  Vitals:    10/08/20 1456 10/08/20 1900 10/08/20 2300 10/09/20 0300   BP: 145/72 123/77 130/74 116/64   BP Location: Left leg Right arm Left arm Right arm   Patient Position: Sitting Sitting Lying Lying   Pulse: 97 84 77 78   Resp: 16 16 16 16   Temp: 97.5 °F (36.4 °C) 96.6 °F (35.9 °C) 97.3 °F (36.3 °C) 97.3 °F (36.3 °C)   TempSrc: Skin Skin Skin Skin   SpO2: 95% 100% 95% 99%   Weight:       Height:           RLE:  NVI, calf nontender, sensation intact  No signs of DVT    Incision: clean, no signs of infection    Lab Results (last 24 hours)     Procedure Component Value Units Date/Time    Basic Metabolic Panel [[id number]]  (Abnormal) Collected: 10/09/20 0504    Specimen: Blood Updated: 10/09/20 0629     Glucose 149 mg/dL      BUN 19 mg/dL      Creatinine 0.71 mg/dL      Sodium 130 mmol/L      Potassium 4.3 mmol/L      Chloride 97 mmol/L      CO2 24.6 mmol/L      Calcium 8.4 mg/dL      eGFR Non African Amer 82 mL/min/1.73      BUN/Creatinine Ratio 26.8     Anion Gap 8.4 mmol/L     Narrative:      GFR Normal >60  Chronic Kidney Disease <60  Kidney Failure <15      CBC (No Diff) [[id number]]  (Abnormal) Collected: 10/09/20 0504    Specimen: Blood Updated: 10/09/20 0603     WBC 10.75 10*3/mm3      RBC 3.12 10*6/mm3      Hemoglobin 8.8 g/dL      Hematocrit 26.1 %      MCV 83.7 fL      MCH 28.2 pg      MCHC 33.7 g/dL      RDW 13.2 %      RDW-SD 40.3 fl      MPV 9.9 fL      Platelets 246 10*3/mm3     POC Glucose Once [[id number]]  (Abnormal) Collected: 10/09/20 0556    Specimen: Blood Updated: 10/09/20 0557     Glucose 153 mg/dL     POC Glucose Once [[id number]]  (Abnormal) Collected: 10/08/20 2146    Specimen: Blood Updated: 10/08/20 2151     Glucose 206 mg/dL     POC Glucose Once [[id number]]   (Abnormal) Collected: 10/08/20 1636    Specimen: Blood Updated: 10/08/20 1637     Glucose 324 mg/dL     POC Glucose Once [[id number]]  (Abnormal) Collected: 10/08/20 1150    Specimen: Blood Updated: 10/08/20 1203     Glucose 219 mg/dL     POC Glucose Once [[id number]]  (Abnormal) Collected: 10/08/20 0924    Specimen: Blood Updated: 10/08/20 0925     Glucose 169 mg/dL           Xr Knee 1 Or 2 View Right    Result Date: 10/8/2020  Narrative: TWO RADIOGRAPHIC VIEWS OF THE RIGHT KNEE  CLINICAL HISTORY: Postop arthroplasty.  FINDINGS: Two radiographic views of the right knee demonstrate recent postsurgical changes from a right knee arthroplasty. The prosthesis is well positioned. There is no significant periprosthetic lucency. No fractures identified. Typical recent post surgical changes are noted within the adjacent soft tissues.  This report was finalized on 10/8/2020 10:32 AM by Dr. Lj Dupree M.D.      Xr Knee 1 Or 2 View Right    Result Date: 10/2/2020  Narrative: PA AND LATERAL CHEST X-RAY AND 2 VIEWS OF THE RIGHT KNEE 10/01/2020  CLINICAL HISTORY: Preop for right knee surgery scheduled 10/08/2020, history of hypertension.  CHEST: This is correlated to prior chest x-ray on 11/29/2017.  FINDINGS: The cardiomediastinal silhouette and pulmonary vasculature are within normal limits. There is calcified granuloma in the posterior right lung base unchanged. Lungs are clear. Costophrenic angles are sharp.      Impression: No active disease is seen in the chest with no change when compared to prior chest x-ray 11/19/2017.  RIGHT KNEE: 2 views including standing AP and lateral views of the right knee are submitted for interpretation. There is severe joint space narrowing of the lateral compartment of the right knee, mild joint space narrowing medial and patellofemoral compartment. There is mild-to-moderate marginal osteophytic spurring in medial, lateral and patellofemoral compartment compatible with tricompartmental  osteoarthritic change. No acute fracture or malalignment is seen in bones of the knee. On the lateral view, there is a knee joint effusion in the right suprapatellar bursa.  This report was finalized on 10/2/2020 9:25 AM by Dr. Koko Escoto M.D.      Xr Chest Pa & Lateral    Result Date: 10/2/2020  Narrative: PA AND LATERAL CHEST X-RAY AND 2 VIEWS OF THE RIGHT KNEE 10/01/2020  CLINICAL HISTORY: Preop for right knee surgery scheduled 10/08/2020, history of hypertension.  CHEST: This is correlated to prior chest x-ray on 11/29/2017.  FINDINGS: The cardiomediastinal silhouette and pulmonary vasculature are within normal limits. There is calcified granuloma in the posterior right lung base unchanged. Lungs are clear. Costophrenic angles are sharp.      Impression: No active disease is seen in the chest with no change when compared to prior chest x-ray 11/19/2017.  RIGHT KNEE: 2 views including standing AP and lateral views of the right knee are submitted for interpretation. There is severe joint space narrowing of the lateral compartment of the right knee, mild joint space narrowing medial and patellofemoral compartment. There is mild-to-moderate marginal osteophytic spurring in medial, lateral and patellofemoral compartment compatible with tricompartmental osteoarthritic change. No acute fracture or malalignment is seen in bones of the knee. On the lateral view, there is a knee joint effusion in the right suprapatellar bursa.  This report was finalized on 10/2/2020 9:25 AM by Dr. Koko Escoto M.D.        Assesment/Plan:    Procedures:  Right TKA  Postoperative Day: 1  Weightbearing Status:  WBAT with walker  DVT Prophylaxis:  ASA for DVT prophylaxis    Disposition:  Home with home health after PT today, if comfortable and mobilizing safely    [facility]  [address] [place] [address]  [phone]    10/9/2020

## 2020-10-10 NOTE — PLAN OF CARE
Goal Outcome Evaluation:  Plan of Care Reviewed With: patient  Progress: improving  Outcome Summary: VSS. Pain controlled with PO pain med. and toradol x1. Dressing c/d/i. Dressing to HV site changed x1 r/t increased drainage, remained c/d/i rest of shift. Up to chair and worked with PT today. Ambulates with assist and walker. Voding per BRP. Discussed blood glucose med and monitoring r/t DM. Discharging home with HH today.

## 2021-03-16 ENCOUNTER — BULK ORDERING (OUTPATIENT)
Dept: CASE MANAGEMENT | Facility: OTHER | Age: 71
End: 2021-03-16

## 2021-03-16 DIAGNOSIS — Z23 IMMUNIZATION DUE: ICD-10-CM

## 2023-10-25 ENCOUNTER — APPOINTMENT (OUTPATIENT)
Dept: CT IMAGING | Facility: HOSPITAL | Age: 73
End: 2023-10-25
Payer: MEDICARE

## 2023-10-25 ENCOUNTER — HOSPITAL ENCOUNTER (OUTPATIENT)
Facility: HOSPITAL | Age: 73
Setting detail: OBSERVATION
Discharge: HOME | End: 2023-10-26
Attending: EMERGENCY MEDICINE | Admitting: INTERNAL MEDICINE
Payer: MEDICARE
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
## 2023-10-25 DIAGNOSIS — S01.112A LACERATION OF LEFT EYEBROW, INITIAL ENCOUNTER: ICD-10-CM

## 2023-10-25 DIAGNOSIS — I62.00 SUBDURAL HEMORRHAGE: Primary | ICD-10-CM

## 2023-10-25 DIAGNOSIS — S00.31XA ABRASION OF NOSE, INITIAL ENCOUNTER: ICD-10-CM

## 2023-10-25 DIAGNOSIS — S02.2XXA CLOSED FRACTURE OF NASAL BONE, INITIAL ENCOUNTER: ICD-10-CM

## 2023-10-25 PROBLEM — S06.5XAA SUBDURAL HEMATOMA: Status: ACTIVE | Noted: 2023-10-25

## 2023-10-25 LAB
ALBUMIN SERPL-MCNC: 4.3 G/DL (ref 3.5–5.2)
ALBUMIN/GLOB SERPL: 1.5 G/DL
ALP SERPL-CCNC: 84 U/L (ref 39–117)
ALT SERPL W P-5'-P-CCNC: 13 U/L (ref 1–33)
ANION GAP SERPL CALCULATED.3IONS-SCNC: 13.9 MMOL/L (ref 5–15)
APTT PPP: 29.4 SECONDS (ref 22.7–35.4)
AST SERPL-CCNC: 18 U/L (ref 1–32)
BASOPHILS # BLD AUTO: 0.07 10*3/MM3 (ref 0–0.2)
BASOPHILS NFR BLD AUTO: 0.7 % (ref 0–1.5)
BILIRUB SERPL-MCNC: <0.2 MG/DL (ref 0–1.2)
BUN SERPL-MCNC: 18 MG/DL (ref 8–23)
BUN/CREAT SERPL: 17.3 (ref 7–25)
CALCIUM SPEC-SCNC: 9.4 MG/DL (ref 8.6–10.5)
CHLORIDE SERPL-SCNC: 102 MMOL/L (ref 98–107)
CO2 SERPL-SCNC: 21.1 MMOL/L (ref 22–29)
CREAT SERPL-MCNC: 1.04 MG/DL (ref 0.57–1)
DEPRECATED RDW RBC AUTO: 41.3 FL (ref 37–54)
EGFRCR SERPLBLD CKD-EPI 2021: 57.6 ML/MIN/1.73
EOSINOPHIL # BLD AUTO: 0.07 10*3/MM3 (ref 0–0.4)
EOSINOPHIL NFR BLD AUTO: 0.7 % (ref 0.3–6.2)
ERYTHROCYTE [DISTWIDTH] IN BLOOD BY AUTOMATED COUNT: 13.3 % (ref 12.3–15.4)
GLOBULIN UR ELPH-MCNC: 2.8 GM/DL
GLUCOSE SERPL-MCNC: 165 MG/DL (ref 65–99)
HCT VFR BLD AUTO: 34.1 % (ref 34–46.6)
HGB BLD-MCNC: 11.9 G/DL (ref 12–15.9)
IMM GRANULOCYTES # BLD AUTO: 0.02 10*3/MM3 (ref 0–0.05)
IMM GRANULOCYTES NFR BLD AUTO: 0.2 % (ref 0–0.5)
INR PPP: 0.97 (ref 0.9–1.1)
LYMPHOCYTES # BLD AUTO: 1.56 10*3/MM3 (ref 0.7–3.1)
LYMPHOCYTES NFR BLD AUTO: 16 % (ref 19.6–45.3)
MCH RBC QN AUTO: 29.4 PG (ref 26.6–33)
MCHC RBC AUTO-ENTMCNC: 34.9 G/DL (ref 31.5–35.7)
MCV RBC AUTO: 84.2 FL (ref 79–97)
MONOCYTES # BLD AUTO: 0.82 10*3/MM3 (ref 0.1–0.9)
MONOCYTES NFR BLD AUTO: 8.4 % (ref 5–12)
NEUTROPHILS NFR BLD AUTO: 7.21 10*3/MM3 (ref 1.7–7)
NEUTROPHILS NFR BLD AUTO: 74 % (ref 42.7–76)
NRBC BLD AUTO-RTO: 0 /100 WBC (ref 0–0.2)
PLATELET # BLD AUTO: 302 10*3/MM3 (ref 140–450)
PMV BLD AUTO: 9.4 FL (ref 6–12)
POTASSIUM SERPL-SCNC: 4.2 MMOL/L (ref 3.5–5.2)
PROT SERPL-MCNC: 7.1 G/DL (ref 6–8.5)
PROTHROMBIN TIME: 13 SECONDS (ref 11.7–14.2)
RBC # BLD AUTO: 4.05 10*6/MM3 (ref 3.77–5.28)
SODIUM SERPL-SCNC: 137 MMOL/L (ref 136–145)
WBC NRBC COR # BLD: 9.75 10*3/MM3 (ref 3.4–10.8)

## 2023-10-25 PROCEDURE — 90471 IMMUNIZATION ADMIN: CPT | Performed by: EMERGENCY MEDICINE

## 2023-10-25 PROCEDURE — 96374 THER/PROPH/DIAG INJ IV PUSH: CPT

## 2023-10-25 PROCEDURE — 72125 CT NECK SPINE W/O DYE: CPT

## 2023-10-25 PROCEDURE — 85025 COMPLETE CBC W/AUTO DIFF WBC: CPT | Performed by: PHYSICIAN ASSISTANT

## 2023-10-25 PROCEDURE — 70486 CT MAXILLOFACIAL W/O DYE: CPT

## 2023-10-25 PROCEDURE — 70450 CT HEAD/BRAIN W/O DYE: CPT

## 2023-10-25 PROCEDURE — 25010000002 LABETALOL 5 MG/ML SOLUTION: Performed by: PHYSICIAN ASSISTANT

## 2023-10-25 PROCEDURE — 80053 COMPREHEN METABOLIC PANEL: CPT | Performed by: PHYSICIAN ASSISTANT

## 2023-10-25 PROCEDURE — G0378 HOSPITAL OBSERVATION PER HR: HCPCS

## 2023-10-25 PROCEDURE — 90715 TDAP VACCINE 7 YRS/> IM: CPT | Performed by: EMERGENCY MEDICINE

## 2023-10-25 PROCEDURE — 85730 THROMBOPLASTIN TIME PARTIAL: CPT | Performed by: PHYSICIAN ASSISTANT

## 2023-10-25 PROCEDURE — 85610 PROTHROMBIN TIME: CPT | Performed by: PHYSICIAN ASSISTANT

## 2023-10-25 PROCEDURE — 99284 EMERGENCY DEPT VISIT MOD MDM: CPT

## 2023-10-25 PROCEDURE — 25010000002 TETANUS-DIPHTH-ACELL PERTUSSIS 5-2.5-18.5 LF-MCG/0.5 SUSPENSION PREFILLED SYRINGE: Performed by: EMERGENCY MEDICINE

## 2023-10-25 RX ORDER — ACETAMINOPHEN 160 MG/5ML
650 SOLUTION ORAL EVERY 4 HOURS PRN
Status: DISCONTINUED | OUTPATIENT
Start: 2023-10-25 | End: 2023-10-26 | Stop reason: HOSPADM

## 2023-10-25 RX ORDER — INSULIN LISPRO 100 [IU]/ML
2-7 INJECTION, SOLUTION INTRAVENOUS; SUBCUTANEOUS
Status: DISCONTINUED | OUTPATIENT
Start: 2023-10-25 | End: 2023-10-26 | Stop reason: HOSPADM

## 2023-10-25 RX ORDER — IBUPROFEN 600 MG/1
1 TABLET ORAL
Status: DISCONTINUED | OUTPATIENT
Start: 2023-10-25 | End: 2023-10-26 | Stop reason: HOSPADM

## 2023-10-25 RX ORDER — CYANOCOBALAMIN 1000 UG/ML
1000 INJECTION, SOLUTION INTRAMUSCULAR; SUBCUTANEOUS
COMMUNITY
End: 2023-10-26 | Stop reason: HOSPADM

## 2023-10-25 RX ORDER — BISACODYL 10 MG
10 SUPPOSITORY, RECTAL RECTAL DAILY PRN
Status: DISCONTINUED | OUTPATIENT
Start: 2023-10-25 | End: 2023-10-26 | Stop reason: HOSPADM

## 2023-10-25 RX ORDER — ACETAMINOPHEN 500 MG
1000 TABLET ORAL ONCE
Status: COMPLETED | OUTPATIENT
Start: 2023-10-25 | End: 2023-10-25

## 2023-10-25 RX ORDER — BISACODYL 5 MG/1
5 TABLET, DELAYED RELEASE ORAL DAILY PRN
Status: DISCONTINUED | OUTPATIENT
Start: 2023-10-25 | End: 2023-10-26 | Stop reason: HOSPADM

## 2023-10-25 RX ORDER — ACETAMINOPHEN 650 MG/1
650 SUPPOSITORY RECTAL EVERY 4 HOURS PRN
Status: DISCONTINUED | OUTPATIENT
Start: 2023-10-25 | End: 2023-10-26 | Stop reason: HOSPADM

## 2023-10-25 RX ORDER — ONDANSETRON 2 MG/ML
4 INJECTION INTRAMUSCULAR; INTRAVENOUS EVERY 6 HOURS PRN
Status: DISCONTINUED | OUTPATIENT
Start: 2023-10-25 | End: 2023-10-26 | Stop reason: HOSPADM

## 2023-10-25 RX ORDER — NICOTINE POLACRILEX 4 MG
15 LOZENGE BUCCAL
Status: DISCONTINUED | OUTPATIENT
Start: 2023-10-25 | End: 2023-10-26 | Stop reason: HOSPADM

## 2023-10-25 RX ORDER — NITROFURANTOIN 25; 75 MG/1; MG/1
CAPSULE ORAL 2 TIMES DAILY
COMMUNITY
End: 2023-10-26 | Stop reason: HOSPADM

## 2023-10-25 RX ORDER — POLYETHYLENE GLYCOL 3350 17 G/17G
17 POWDER, FOR SOLUTION ORAL DAILY PRN
Status: DISCONTINUED | OUTPATIENT
Start: 2023-10-25 | End: 2023-10-26 | Stop reason: HOSPADM

## 2023-10-25 RX ORDER — FLUOXETINE HYDROCHLORIDE 20 MG/1
40 CAPSULE ORAL DAILY
Status: CANCELLED | OUTPATIENT
Start: 2023-10-26

## 2023-10-25 RX ORDER — MELOXICAM 15 MG/1
1 TABLET ORAL DAILY
COMMUNITY
End: 2023-10-26 | Stop reason: HOSPADM

## 2023-10-25 RX ORDER — NITROGLYCERIN 0.4 MG/1
0.4 TABLET SUBLINGUAL
Status: DISCONTINUED | OUTPATIENT
Start: 2023-10-25 | End: 2023-10-26 | Stop reason: HOSPADM

## 2023-10-25 RX ORDER — METOPROLOL SUCCINATE 25 MG/1
25 TABLET, EXTENDED RELEASE ORAL DAILY
Status: CANCELLED | OUTPATIENT
Start: 2023-10-26

## 2023-10-25 RX ORDER — SODIUM CHLORIDE 9 MG/ML
40 INJECTION, SOLUTION INTRAVENOUS AS NEEDED
Status: DISCONTINUED | OUTPATIENT
Start: 2023-10-25 | End: 2023-10-26 | Stop reason: HOSPADM

## 2023-10-25 RX ORDER — AMLODIPINE BESYLATE 5 MG/1
5 TABLET ORAL DAILY
Status: CANCELLED | OUTPATIENT
Start: 2023-10-26

## 2023-10-25 RX ORDER — GLUCAGON 3 MG/1
3 POWDER NASAL
COMMUNITY
Start: 2023-10-21

## 2023-10-25 RX ORDER — SODIUM CHLORIDE 0.9 % (FLUSH) 0.9 %
10 SYRINGE (ML) INJECTION AS NEEDED
Status: DISCONTINUED | OUTPATIENT
Start: 2023-10-25 | End: 2023-10-26 | Stop reason: HOSPADM

## 2023-10-25 RX ORDER — LISINOPRIL 2.5 MG/1
TABLET ORAL
COMMUNITY

## 2023-10-25 RX ORDER — LEVOTHYROXINE SODIUM 0.05 MG/1
50 TABLET ORAL DAILY
Status: CANCELLED | OUTPATIENT
Start: 2023-10-26

## 2023-10-25 RX ORDER — LABETALOL HYDROCHLORIDE 5 MG/ML
10 INJECTION, SOLUTION INTRAVENOUS ONCE
Status: COMPLETED | OUTPATIENT
Start: 2023-10-25 | End: 2023-10-25

## 2023-10-25 RX ORDER — ACETAMINOPHEN 325 MG/1
650 TABLET ORAL EVERY 4 HOURS PRN
Status: DISCONTINUED | OUTPATIENT
Start: 2023-10-25 | End: 2023-10-26 | Stop reason: HOSPADM

## 2023-10-25 RX ORDER — ATORVASTATIN CALCIUM 20 MG/1
10 TABLET, FILM COATED ORAL DAILY
Status: CANCELLED | OUTPATIENT
Start: 2023-10-26

## 2023-10-25 RX ORDER — AMOXICILLIN 250 MG
2 CAPSULE ORAL 2 TIMES DAILY
Status: DISCONTINUED | OUTPATIENT
Start: 2023-10-25 | End: 2023-10-26 | Stop reason: HOSPADM

## 2023-10-25 RX ORDER — NATEGLINIDE 120 MG/1
TABLET ORAL
COMMUNITY
Start: 2023-07-03

## 2023-10-25 RX ORDER — HYDRALAZINE HYDROCHLORIDE 20 MG/ML
10 INJECTION INTRAMUSCULAR; INTRAVENOUS EVERY 6 HOURS PRN
Status: DISCONTINUED | OUTPATIENT
Start: 2023-10-25 | End: 2023-10-26 | Stop reason: HOSPADM

## 2023-10-25 RX ORDER — SODIUM CHLORIDE 0.9 % (FLUSH) 0.9 %
10 SYRINGE (ML) INJECTION EVERY 12 HOURS SCHEDULED
Status: DISCONTINUED | OUTPATIENT
Start: 2023-10-25 | End: 2023-10-26 | Stop reason: HOSPADM

## 2023-10-25 RX ORDER — SEMAGLUTIDE 1.34 MG/ML
INJECTION, SOLUTION SUBCUTANEOUS
COMMUNITY
End: 2023-10-26 | Stop reason: HOSPADM

## 2023-10-25 RX ORDER — LISINOPRIL 5 MG/1
2.5 TABLET ORAL
Status: CANCELLED | OUTPATIENT
Start: 2023-10-26

## 2023-10-25 RX ORDER — DEXTROSE MONOHYDRATE 25 G/50ML
25 INJECTION, SOLUTION INTRAVENOUS
Status: DISCONTINUED | OUTPATIENT
Start: 2023-10-25 | End: 2023-10-26 | Stop reason: HOSPADM

## 2023-10-25 RX ADMIN — TETANUS TOXOID, REDUCED DIPHTHERIA TOXOID AND ACELLULAR PERTUSSIS VACCINE, ADSORBED 0.5 ML: 5; 2.5; 8; 8; 2.5 SUSPENSION INTRAMUSCULAR at 17:22

## 2023-10-25 RX ADMIN — Medication 3 ML: at 17:42

## 2023-10-25 RX ADMIN — LABETALOL HYDROCHLORIDE 10 MG: 5 INJECTION, SOLUTION INTRAVENOUS at 21:24

## 2023-10-25 RX ADMIN — ACETAMINOPHEN 1000 MG: 500 TABLET ORAL at 17:22

## 2023-10-25 NOTE — ED TRIAGE NOTES
Patient to ED via [facility] from home. Patient had a mechanical fall today and fell on her face. Patient did not lose consciousness, patient is not on blood thinners.

## 2023-10-25 NOTE — ED PROVIDER NOTES
EMERGENCY DEPARTMENT ENCOUNTER    Room Number:  10/10  PCP: Provider, No Known  Discussed/ obtained information from independent historians: Daughter at bedside      HPI:  Chief Complaint: Fall    Context: Vanessa Dorsey is a 73 y.o. female who presents to the ED c/o fall.  Patient reports she was walking her dog with her grandchildren when she tripped on the sidewalk and fell forward onto her face.  She denies LOC.  She is not anticoagulated.  She did sustain a laceration over the left eyebrow and an abrasion to the right nose.  Unknown last Tdap.  Patient does complain of mild headache.  Patient denies neck pain, chest pain, back pain, abdominal pain, unilateral numbness/weakness, or any other systemic complaints.      External (non-ED) record review:   Reviewed note from office visit with PCP on 10/9/2023 where patient seen for frequent falls and hypoglycemia.  Reviewed assessment and plan.  Patient referred to physical therapy and will follow-up in office in 3 months.  Reviewed prior laboratory studies.  Most recent CMP with creatinine 0.97.  Most recent CBC with hemoglobin 12.1.      PAST MEDICAL HISTORY  Active Ambulatory Problems     Diagnosis Date Noted    Hyperglycemia 11/29/2017    Essential hypertension 11/29/2017    Type 2 diabetes mellitus with hyperglycemia 11/29/2017    Hypothyroidism 11/30/2017    UTI (urinary tract infection) 04/03/2018    Dehydration 04/03/2018    Viral illness 04/03/2018    Primary osteoarthritis of right knee 10/08/2020     Resolved Ambulatory Problems     Diagnosis Date Noted    No Resolved Ambulatory Problems     Past Medical History:   Diagnosis Date    Arthritis     Diabetes mellitus     Disease of thyroid gland     Elevated cholesterol     Hyperlipidemia     Hypertension     Right knee pain          PAST SURGICAL HISTORY  Past Surgical History:   Procedure Laterality Date    COLONOSCOPY      HYSTERECTOMY      KNEE ARTHROSCOPY MENISCUS TRANSPLANT Right     AUG 2019     LASIK Bilateral     SHOULDER ARTHROSCOPY Bilateral     TOTAL KNEE ARTHROPLASTY Right 10/8/2020    Procedure: RIGHT TOTAL KNEE ARTHROPLASTY;  Surgeon: Koko Small MD;  Location: [facility] OR;  Service: Orthopedics;  Laterality: Right;    TUBAL ABDOMINAL LIGATION           FAMILY HISTORY  Family History   Problem Relation Age of Onset    Diabetes Mother     COPD Mother     Diabetes Father     COPD Sister     Malig Hyperthermia Neg Hx          SOCIAL HISTORY  Social History     Socioeconomic History    Marital status:    Tobacco Use    Smoking status: Former     Packs/day: 1.00     Years: 40.00     Additional pack years: 0.00     Total pack years: 40.00     Types: Cigarettes    Smokeless tobacco: Never    Tobacco comments:     quit n15 years ago   Substance and Sexual Activity    Alcohol use: No     Comment: 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   HENT:  Negative for ear pain and sore throat.    Respiratory:  Negative for cough and shortness of breath.    Cardiovascular:  Negative for chest pain and palpitations.   Gastrointestinal:  Negative for abdominal pain and vomiting.   Genitourinary:  Negative for dysuria and hematuria.   Musculoskeletal:  Negative for arthralgias and joint swelling.   Skin:  Positive for wound. Negative for pallor and rash.   Neurological:  Positive for headaches. Negative for numbness.   Psychiatric/Behavioral:  Negative for confusion and hallucinations.             PHYSICAL EXAM  ED Triage Vitals   Temp Heart Rate Resp BP SpO2   10/25/23 1633 10/25/23 1633 10/25/23 1633 10/25/23 1637 10/25/23 1633   97.6 °F (36.4 °C) 116 18 141/96 98 %      Temp src Heart Rate Source Patient Position BP Location FiO2 (%)   10/25/23 1633 10/25/23 1633 -- -- --    Tympanic Monitor          Physical Exam  Constitutional:       General: She is not in acute distress.     Appearance: She is well-developed.   HENT:      Head: Normocephalic and atraumatic.     Eyes:      Extraocular Movements: Extraocular movements intact.      Conjunctiva/sclera: Conjunctivae normal.      Pupils: Pupils are equal, round, and reactive to light.   Cardiovascular:      Rate and Rhythm: Normal rate and regular rhythm.      Heart sounds: Normal heart sounds.      Comments: Distal pulses intact  Pulmonary:      Effort: Pulmonary effort is normal.      Breath sounds: Normal breath sounds.   Abdominal:      General: There is no distension.   Skin:     General: Skin is warm.   Neurological:      General: No focal deficit present.      Mental Status: She is alert and oriented to person, place, and time.   Psychiatric:         Mood and Affect: Mood normal.         Vital signs and nursing notes reviewed.          LAB RESULTS  Recent Results (from the past 24 hour(s))   CBC Auto Differential    Collection Time: 10/25/23  8:55 PM    Specimen: Arm, Right; Blood   Result Value Ref Range    WBC 9.75 3.40 - 10.80 10*3/mm3    RBC 4.05 3.77 - 5.28 10*6/mm3    Hemoglobin 11.9 (L) 12.0 - 15.9 g/dL    Hematocrit 34.1 34.0 - 46.6 %    MCV 84.2 79.0 - 97.0 fL    MCH 29.4 26.6 - 33.0 pg    MCHC 34.9 31.5 - 35.7 g/dL    RDW 13.3 12.3 - 15.4 %    RDW-SD 41.3 37.0 - 54.0 fl    MPV 9.4 6.0 - 12.0 fL    Platelets 302 140 - 450 10*3/mm3    Neutrophil % 74.0 42.7 - 76.0 %    Lymphocyte % 16.0 (L) 19.6 - 45.3 %    Monocyte % 8.4 5.0 - 12.0 %    Eosinophil % 0.7 0.3 - 6.2 %    Basophil % 0.7 0.0 - 1.5 %    Immature Grans % 0.2 0.0 - 0.5 %    Neutrophils, Absolute 7.21 (H) 1.70 - 7.00 10*3/mm3    Lymphocytes, Absolute 1.56 0.70 - 3.10 10*3/mm3    Monocytes, Absolute 0.82 0.10 - 0.90 10*3/mm3    Eosinophils, Absolute 0.07 0.00 - 0.40 10*3/mm3    Basophils, Absolute 0.07 0.00 - 0.20 10*3/mm3    Immature Grans, Absolute 0.02 0.00 -  0.05 10*3/mm3    nRBC 0.0 0.0 - 0.2 /100 WBC       Ordered the above labs and reviewed the results.        RADIOLOGY  CT Head Without Contrast, CT Cervical Spine Without Contrast, CT Facial Bones Without Contrast    Result Date: 10/25/2023  HISTORY: Head and facial bone injury. Neck injury with neck pain.  CT of the brain without contrast 10/25/2023  Axial images were obtained through the brain without intravenous contrast.  There is a small focal area of high attenuation along the rightward aspect of the falx measuring approximately 8 mm this is consistent with a tiny subdural hemorrhage.  There is mild to moderate diffuse atrophy. Decreased attenuation of the periventricular white matter is seen bilaterally consistent with small vessel white matter ischemic disease. No other areas of hemorrhage are seen. There is mucosal thickening in the right maxillary sinus. Minimally displaced anterior nasal bone fracture is seen.      1. Small subdural hematoma along the rightward aspect of the falx as described. 2. Mild to moderate atrophy and small vessel white matter ischemic disease. 3. Nasal bone fracture. Please additional dictation for the CT of the facial bones.  CT of the facial bones without contrast  Axial images were obtained through the facial bones. Sagittal and coronal reconstruction images were reviewed.  There is minimally displaced anterior nasal bone fracture. No other facial bone fractures are seen. Nasal septum is midline. There is moderately severe opacification of the right maxillary sinus consistent with sinusitis. Remainder of the sinuses appear clear.  IMPRESSION: 1. Mildly displaced nasal bone fracture. 2. Right maxillary sinusitis. 3. No other facial bone fractures are seen.  CT of the cervical spine without contrast  Axial images were obtained from the skull base to the upper thoracic spine. Sagittal and coronal reconstruction images were reviewed.  There is degenerative disease of the C1-C2  articulation. There is mild C6-7 spondylosis with probable degenerative endplate change along the inferior aspect of C6. No significant subluxation is seen. No cervical spine fractures are seen.  IMPRESSION: 1. Mild cervical degenerative disease. 2. No acute bony abnormality is seen.     Radiation dose reduction techniques were utilized, including automated exposure control and exposure modulation based on body size.        Ordered the above noted radiological studies. Reviewed by me in PACS.            PROCEDURES  Laceration Repair    Date/Time: 10/25/2023 8:11 PM    Performed by: Chana iGbbs PA-C  Authorized by: Néstor Flores MD    Consent:     Consent obtained:  Verbal    Consent given by:  Patient    Risks, benefits, and alternatives were discussed: yes      Risks discussed:  Poor cosmetic result and pain    Alternatives discussed:  No treatment  Universal protocol:     Procedure explained and questions answered to patient or proxy's satisfaction: yes      Patient identity confirmed:  Verbally with patient  Anesthesia:     Anesthesia method:  Topical application    Topical anesthetic:  LET  Laceration details:     Location:  Face    Face location:  L eyebrow    Length (cm):  3  Pre-procedure details:     Preparation:  Patient was prepped and draped in usual sterile fashion and imaging obtained to evaluate for foreign bodies  Exploration:     Hemostasis achieved with:  LET    Imaging outcome: foreign body not noted      Wound exploration: wound explored through full range of motion and entire depth of wound visualized      Wound extent: no foreign bodies/material noted      Contaminated: no    Treatment:     Area cleansed with:  Saline    Amount of cleaning:  Standard    Irrigation solution:  Sterile saline    Irrigation volume:  20 ml    Irrigation method:  Tap    Visualized foreign bodies/material removed: no      Debridement:  None    Undermining:  None    Scar revision: no    Skin repair:      Repair method:  Sutures    Suture size:  6-0    Suture material:  Prolene    Suture technique:  Simple interrupted    Number of sutures:  5  Approximation:     Approximation:  Close  Repair type:     Repair type:  Simple  Post-procedure details:     Dressing:  Open (no dressing)    Procedure completion:  Tolerated well, no immediate complications                MEDICATIONS GIVEN IN ER  Medications   labetalol (NORMODYNE,TRANDATE) injection 10 mg (has no administration in time range)   Tetanus-Diphth-Acell Pertussis (BOOSTRIX) injection 0.5 mL (0.5 mL Intramuscular Given 10/25/23 1722)   Lido-EPINEPHrine-Tetracaine 4-0.18-0.5 % gel 3 mL (3 mL Topical Given 10/25/23 1742)   acetaminophen (TYLENOL) tablet 1,000 mg (1,000 mg Oral Given 10/25/23 1722)                   MEDICAL DECISION MAKING, PROGRESS, and CONSULTS    All labs have been independently reviewed by me.  All radiology studies have been reviewed by me and I have also reviewed the radiology report.   EKG's independently viewed and interpreted by me.  Discussion below represents my analysis of pertinent findings related to patient's condition, differential diagnosis, treatment plan and final disposition.            Orders placed during this visit:  Orders Placed This Encounter   Procedures    Laceration Repair    CT Head Without Contrast    CT Cervical Spine Without Contrast    CT Facial Bones Without Contrast    Comprehensive Metabolic Panel    Protime-INR    aPTT    CBC Auto Differential    Neurosurgery (on-call MD unless specified)    [facility] (on-call MD unless specified) Details    Initiate Observation Status    CBC & Differential           Differential diagnosis:  Closed head injury, intracranial hemorrhage, facial laceration      Independent interpretation of labs, radiology studies, and discussions with consultants:  ED Course as of 10/25/23 2113   Wed Oct 25, 2023   2035 I spoke with Dr. Boyle with [facility].  Reviewed patient presentation and ED findings.   He agrees with admission to a telemetry bed and plan for repeat head CT in the morning.  Recommends BP under 140 [MP]   2105 Updated patient on work-up findings and plan for admission. [MP]   2109 I spoke with Dr. Akbar with [facility].  Reviewed patient presentation and ED findings.  He agrees to admit to a telemetry observation bed. [MP]      ED Course User Index  [MP] Chana Gibbs PA-C         Additional orders considered but not ordered:  MRI of the brain      Additional sources:    - Chronic or social conditions impacting care: Diabetes          DIAGNOSIS  Final diagnoses:   Subdural hemorrhage   Laceration of left eyebrow, initial encounter   Closed fracture of nasal bone, initial encounter   Abrasion of nose, initial encounter   Fall, initial encounter         33 minutes of critical care provided. This time excludes other billable procedures. Time does include preparation of documents, medical consultations, review of old records, and direct bedside care. Patient is at high risk for life-threatening deterioration due to acute subdural hematoma with risk for neurologic deficit.         Latest Documented Vital Signs:  As of 21:13 EDT  BP- 152/83 HR- 103 Temp- 97.6 °F (36.4 °C) (Tympanic) O2 sat- 96%              --    Please note that portions of this were completed with a voice recognition program.       Note Disclaimer: At [facility], we believe that sharing information builds trust and better relationships. You are receiving this note because you are receiving care at [facility] or recently visited. It is possible you will see health information before a provider has talked with you about it. This kind of information can be easy to misunderstand. To help you fully understand what it means for your health, we urge you to discuss this note with your provider.             Chana Gibbs PA-C  10/25/23 2114

## 2023-10-26 ENCOUNTER — APPOINTMENT (OUTPATIENT)
Dept: CT IMAGING | Facility: HOSPITAL | Age: 73
End: 2023-10-26
Payer: MEDICARE

## 2023-10-26 VITALS
TEMPERATURE: 98.2 F | HEART RATE: 96 BPM | OXYGEN SATURATION: 99 % | DIASTOLIC BLOOD PRESSURE: 74 MMHG | SYSTOLIC BLOOD PRESSURE: 145 MMHG | HEIGHT: 64 IN | BODY MASS INDEX: 21.68 KG/M2 | RESPIRATION RATE: 18 BRPM | WEIGHT: 127 LBS

## 2023-10-26 LAB
ANION GAP SERPL CALCULATED.3IONS-SCNC: 9.1 MMOL/L (ref 5–15)
BASOPHILS # BLD AUTO: 0.07 10*3/MM3 (ref 0–0.2)
BASOPHILS NFR BLD AUTO: 1.3 % (ref 0–1.5)
BUN SERPL-MCNC: 20 MG/DL (ref 8–23)
BUN/CREAT SERPL: 22.7 (ref 7–25)
CALCIUM SPEC-SCNC: 9.3 MG/DL (ref 8.6–10.5)
CHLORIDE SERPL-SCNC: 108 MMOL/L (ref 98–107)
CO2 SERPL-SCNC: 21.9 MMOL/L (ref 22–29)
CREAT SERPL-MCNC: 0.88 MG/DL (ref 0.57–1)
DEPRECATED RDW RBC AUTO: 41.7 FL (ref 37–54)
EGFRCR SERPLBLD CKD-EPI 2021: 70.4 ML/MIN/1.73
EOSINOPHIL # BLD AUTO: 0.15 10*3/MM3 (ref 0–0.4)
EOSINOPHIL NFR BLD AUTO: 2.8 % (ref 0.3–6.2)
ERYTHROCYTE [DISTWIDTH] IN BLOOD BY AUTOMATED COUNT: 13.3 % (ref 12.3–15.4)
GLUCOSE BLDC GLUCOMTR-MCNC: 126 MG/DL (ref 70–130)
GLUCOSE BLDC GLUCOMTR-MCNC: 139 MG/DL (ref 70–130)
GLUCOSE BLDC GLUCOMTR-MCNC: 91 MG/DL (ref 70–130)
GLUCOSE SERPL-MCNC: 132 MG/DL (ref 65–99)
HCT VFR BLD AUTO: 33 % (ref 34–46.6)
HGB BLD-MCNC: 10.9 G/DL (ref 12–15.9)
IMM GRANULOCYTES # BLD AUTO: 0.01 10*3/MM3 (ref 0–0.05)
IMM GRANULOCYTES NFR BLD AUTO: 0.2 % (ref 0–0.5)
LYMPHOCYTES # BLD AUTO: 1.46 10*3/MM3 (ref 0.7–3.1)
LYMPHOCYTES NFR BLD AUTO: 26.8 % (ref 19.6–45.3)
MCH RBC QN AUTO: 28.1 PG (ref 26.6–33)
MCHC RBC AUTO-ENTMCNC: 33 G/DL (ref 31.5–35.7)
MCV RBC AUTO: 85.1 FL (ref 79–97)
MONOCYTES # BLD AUTO: 0.63 10*3/MM3 (ref 0.1–0.9)
MONOCYTES NFR BLD AUTO: 11.6 % (ref 5–12)
NEUTROPHILS NFR BLD AUTO: 3.13 10*3/MM3 (ref 1.7–7)
NEUTROPHILS NFR BLD AUTO: 57.3 % (ref 42.7–76)
NRBC BLD AUTO-RTO: 0 /100 WBC (ref 0–0.2)
PLATELET # BLD AUTO: 259 10*3/MM3 (ref 140–450)
PMV BLD AUTO: 9.5 FL (ref 6–12)
POTASSIUM SERPL-SCNC: 4.2 MMOL/L (ref 3.5–5.2)
RBC # BLD AUTO: 3.88 10*6/MM3 (ref 3.77–5.28)
SODIUM SERPL-SCNC: 139 MMOL/L (ref 136–145)
WBC NRBC COR # BLD: 5.45 10*3/MM3 (ref 3.4–10.8)

## 2023-10-26 PROCEDURE — 99203 OFFICE O/P NEW LOW 30 MIN: CPT | Performed by: NURSE PRACTITIONER

## 2023-10-26 PROCEDURE — 85025 COMPLETE CBC W/AUTO DIFF WBC: CPT | Performed by: INTERNAL MEDICINE

## 2023-10-26 PROCEDURE — 70450 CT HEAD/BRAIN W/O DYE: CPT

## 2023-10-26 PROCEDURE — 97110 THERAPEUTIC EXERCISES: CPT

## 2023-10-26 PROCEDURE — 80048 BASIC METABOLIC PNL TOTAL CA: CPT | Performed by: INTERNAL MEDICINE

## 2023-10-26 PROCEDURE — 82948 REAGENT STRIP/BLOOD GLUCOSE: CPT

## 2023-10-26 PROCEDURE — G0378 HOSPITAL OBSERVATION PER HR: HCPCS

## 2023-10-26 PROCEDURE — 97165 OT EVAL LOW COMPLEX 30 MIN: CPT

## 2023-10-26 PROCEDURE — 36415 COLL VENOUS BLD VENIPUNCTURE: CPT | Performed by: INTERNAL MEDICINE

## 2023-10-26 PROCEDURE — 97161 PT EVAL LOW COMPLEX 20 MIN: CPT

## 2023-10-26 RX ADMIN — ACETAMINOPHEN 650 MG: 325 TABLET, FILM COATED ORAL at 08:30

## 2023-10-26 RX ADMIN — ACETAMINOPHEN 650 MG: 325 TABLET, FILM COATED ORAL at 01:21

## 2023-10-26 RX ADMIN — Medication 10 ML: at 01:22

## 2023-10-26 RX ADMIN — Medication 10 ML: at 08:25

## 2023-10-26 NOTE — THERAPY DISCHARGE NOTE
Acute Care - Physical Therapy Initial Evaluation/Discharge  [facility]     Patient Name: Vanessa Dorsey  : 1950  MRN: [id number]  Today's Date: 10/26/2023   Onset of Illness/Injury or Date of Surgery: 10/25/23            Admit Date: 10/25/2023    Visit Dx:    ICD-10-CM ICD-9-CM   1. Subdural hemorrhage  I62.00 432.1   2. Laceration of left eyebrow, initial encounter  S01.112A 873.42   3. Closed fracture of nasal bone, initial encounter  S02.2XXA 802.0   4. Abrasion of nose, initial encounter  S00.31XA 910.0   5. Fall, initial encounter  W19.XXXA E888.9     Patient Active Problem List   Diagnosis    Hyperglycemia    Essential hypertension    Type 2 diabetes mellitus with hyperglycemia    Hypothyroidism    UTI (urinary tract infection)    Dehydration    Viral illness    Primary osteoarthritis of right knee    Subdural hematoma    Nasal bone fracture     Past Medical History:   Diagnosis Date    Arthritis     Diabetes mellitus     Disease of thyroid gland     Elevated cholesterol     Hyperlipidemia     Hypertension     Right knee pain      Past Surgical History:   Procedure Laterality Date    COLONOSCOPY      HYSTERECTOMY      KNEE ARTHROSCOPY MENISCUS TRANSPLANT Right     AUG 2019    LASIK Bilateral     SHOULDER ARTHROSCOPY Bilateral     TOTAL KNEE ARTHROPLASTY Right 10/8/2020    Procedure: RIGHT TOTAL KNEE ARTHROPLASTY;  Surgeon: Koko Small MD;  Location: [facility] MAIN OR;  Service: Orthopedics;  Laterality: Right;    TUBAL ABDOMINAL LIGATION         PT Assessment (last 12 hours)       PT Evaluation and Treatment       Row Name 10/26/23 1400          Physical Therapy Time and Intention    Subjective Information no complaints  -LH     Document Type discharge evaluation/summary  -     Mode of Treatment physical therapy;co-treatment;occupational therapy  -LH     Patient Effort excellent  -     Comment Co treatment medically appropriate and necessary due to patient acuity level, activity tolerance  and safety of patient and staff.  -       Row Name 10/26/23 1400          General Information    Patient Profile Reviewed yes  -     Onset of Illness/Injury or Date of Surgery 10/25/23  -     Patient Observations alert;cooperative;agree to therapy  -     General Observations of Patient pt supine in bed no acute distress, friend bedside  -     Prior Level of Function independent:  -     Equipment Currently Used at Home none  -     Benefits Reviewed patient:  -     Barriers to Rehab none identified  -       Row Name 10/26/23 1400          Pain    Pretreatment Pain Rating 0/10 - no pain  -     Posttreatment Pain Rating 0/10 - no pain  -       Row Name 10/26/23 1400          Cognition    Affect/Mental Status (Cognition) WFL  -     Orientation Status (Cognition) oriented x 4  -       Row Name 10/26/23 1400          Range of Motion Comprehensive    General Range of Motion bilateral lower extremity ROM WNL  -       Row Name 10/26/23 1400          Strength (Manual Muscle Testing)    Strength (Manual Muscle Testing) strength is WFL;bilateral lower extremities  -       Row Name 10/26/23 1400          Bed Mobility    Bed Mobility sit-supine;supine-sit  -     Supine-Sit [place] (Bed Mobility) independent  -     Sit-Supine [place] (Bed Mobility) independent  -       Row Name 10/26/23 1400          Transfers    Transfers sit-stand transfer;stand-sit transfer  -       Row Name 10/26/23 1400          Sit-Stand Transfer    Sit-Stand [place] (Transfers) independent  -       Row Name 10/26/23 1400          Stand-Sit Transfer    Stand-Sit [place] (Transfers) independent  -       Row Name 10/26/23 1400          Gait/Stairs (Locomotion)    [place] Level (Gait) independent  -     Distance in Feet (Gait) 120  -     Pattern (Gait) step-through  -       Row Name 10/26/23 1400          Balance    Balance Assessment sitting static balance;standing static balance  -      Static Sitting Balance independent  -LH     Position, Sitting Balance unsupported;sitting edge of bed  -     Static Standing Balance independent  -     Position/Device Used, Standing Balance unsupported  -       Row Name             Wound Left upper eyebrow Traumatic    Wound - Properties Group Side: Left  -VD Orientation: upper  -VD Location: eyebrow  -VD Primary Wound Type: Traumatic  -VD Number of Sutures Placed: 5  -VD    Retired Wound - Properties Group Side: Left  -VD Orientation: upper  -VD Location: eyebrow  -VD Primary Wound Type: Traumatic  -VD Number of Sutures Placed: 5  -VD    Retired Wound - Properties Group Side: Left  -VD Location: eyebrow  -VD Primary Wound Type: Traumatic  -VD Number of Sutures Placed: 5  -VD      Row Name 10/26/23 1400          Plan of Care Review    Plan of Care Reviewed With patient;friend  -     Outcome Evaluation Pt 74 yo female admitted s/p fall after tripping while walking granddaughters 2 dogs, noted small SDH and nasal fx. Pt reports baseline totally independent, drives, has DME but doesnt use it. on PT exam pt safely ambulated 120ft indep'ly, no observable significant gait/balance deficits. PT discussed w pt fall prevention strategies including proper shoewear, environmental awareness including lighting at night and throw rug removal. Pt current w OP PT to address balance. Pt to [place] home today, RN notified.  -       Row Name 10/26/23 1400          Positioning and Restraints    Pre-Treatment Position in bed  -     Post Treatment Position bed  -     In Bed sitting EOB;call light within reach;encouraged to call for assist  -       Row Name 10/26/23 1400          Therapy Assessment/Plan (PT)    Criteria for Skilled Interventions Met (PT) no problems identified which require skilled intervention  -     Therapy Frequency (PT) evaluation only  -       Row Name 10/26/23 1400          PT Evaluation Complexity    History, PT Evaluation Complexity 1-2 personal  factors and/or comorbidities  -     Examination of Body Systems (PT Eval Complexity) 1-2 elements  -     Clinical Presentation (PT Evaluation Complexity) stable  -     Clinical Decision Making (PT Evaluation Complexity) low complexity  -     Overall Complexity (PT Evaluation Complexity) low complexity  -               User Key  (r) = Recorded By, (t) = Taken By, (c) = Cosigned By      Initials Name Provider Type     Guera Anaya, PT Physical Therapist    Dianna Mckeon RN Registered Nurse                      Physical Therapy Education       Title: PT OT SLP Therapies (Done)       Topic: Physical Therapy (Done)       Point: Home exercise program (Done)       Learning Progress Summary             Patient Acceptance, E, VU,DU by  at 10/26/2023 1421                         Point: Precautions (Done)       Learning Progress Summary             Patient Acceptance, E, VU,DU by  at 10/26/2023 1421                                         User Key       Initials Effective Dates Name Provider Type Discipline     06/16/21 -  Guera Anaya, PT Physical Therapist PT                    PT Recommendation and Plan  Anticipated Discharge Disposition (PT): home with outpatient therapy services  Therapy Frequency (PT): evaluation only  Plan of Care Reviewed With: patient, friend  Outcome Evaluation: Pt 72 yo female admitted s/p fall after tripping while walking granddaughters 2 dogs, noted small SDH and nasal fx. Pt reports baseline totally independent, drives, has DME but doesnt use it. on PT exam pt safely ambulated 120ft indep'ly, no observable significant gait/balance deficits. PT discussed w pt fall prevention strategies including proper shoewear, environmental awareness including lighting at night and throw rug removal. Pt current w OP PT to address balance. Pt to [place] home today, RN notified.     Outcome Measures       Row Name 10/26/23 1400             How much help from another person do you currently  need...    Turning from your back to your side while in flat bed without using bedrails? 4  -LH      Moving from lying on back to sitting on the side of a flat bed without bedrails? 4  -LH      Moving to and from a bed to a chair (including a wheelchair)? 4  -LH      Standing up from a chair using your arms (e.g., wheelchair, bedside chair)? 4  -LH      Climbing 3-5 steps with a railing? 4  -LH      To walk in hospital room? 4  -      AM-PAC 6 Clicks Score (PT) 24  -      Highest level of mobility 8 --> Walked 250 feet or more  -                User Key  (r) = Recorded By, (t) = Taken By, (c) = Cosigned By      Initials Name Provider Type     Guera Anaya, PT Physical Therapist                     Time Calculation:    PT Charges       Row Name 10/26/23 1421             Time Calculation    Start Time 1340  -      Stop Time 1351  -      Time Calculation (min) 11 min  -      PT Received On 10/26/23  -         Time Calculation- PT    Total Timed Code Minutes- PT 8 minute(s)  -                User Key  (r) = Recorded By, (t) = Taken By, (c) = Cosigned By      Initials Name Provider Type     Guera Anaya, PT Physical Therapist                  Therapy Charges for Today       Code Description Service Date Service Provider Modifiers Qty    79142819002 HC PT EVAL LOW COMPLEXITY 3 10/26/2023 Guera Anaya, PT GP 1    26532721633 HC PT THER PROC EA 15 MIN 10/26/2023 Guera Anaya, PT GP 1            PT G-Codes  AM-PAC 6 Clicks Score (PT): 24    PT Discharge Summary  Anticipated Discharge Disposition (PT): home with outpatient therapy services    Guera Anaya, PT  10/26/2023

## 2023-10-26 NOTE — DISCHARGE INSTR - APPOINTMENTS
Please follow up with ENT. Dr Trinh is with [facility] [phone] Call to make appointment.    Follow up with your primary care physician, stitches were placed 10/25

## 2023-10-26 NOTE — NURSING NOTE
Spoke to Jessika in Dr Davis's office regarding this patient, her discharge is pending consult. Dr Davis is in [facility]. Office staff is working to get ahold of him.

## 2023-10-26 NOTE — ED NOTES
Nursing report ED to floor  Vanessa Dorsey  73 y.o.  female    HPI :   Chief Complaint   Patient presents with    Fall    Head Injury       Admitting doctor:   Koko Akbar MD    Admitting diagnosis:   The primary encounter diagnosis was Subdural hemorrhage. Diagnoses of Laceration of left eyebrow, initial encounter, Closed fracture of nasal bone, initial encounter, Abrasion of nose, initial encounter, and Fall, initial encounter were also pertinent to this visit.    Code status:   Current Code Status       Date Active Code Status Order ID Comments User Context       Prior            Allergies:   Ciprofloxacin and Levemir [insulin detemir]    Isolation:   No active isolations    Intake and Output  No intake or output data in the 24 hours ending 10/25/23 2126    Weight:       10/25/23  1633   Weight: 57.6 kg (127 lb)       Most recent vitals:   Vitals:    10/25/23 1801 10/25/23 1901 10/25/23 2001 10/25/23 2124   BP: 145/75 128/76 152/83 145/78   Pulse: 103 109 103 100   Resp: 18  18    Temp:       TempSrc:       SpO2: 96% 95% 96%    Weight:       Height:           Active LDAs/IV Access:   Lines, Drains & Airways       Active LDAs       Name Placement date Placement time Site Days    Peripheral IV 10/25/23 2055 Right;Medial Forearm 10/25/23  2055  Forearm  less than 1                    Labs (abnormal labs have a star):   Labs Reviewed   CBC WITH AUTO DIFFERENTIAL - Abnormal; Notable for the following components:       Result Value    Hemoglobin 11.9 (*)     Lymphocyte % 16.0 (*)     Neutrophils, Absolute 7.21 (*)     All other components within normal limits   PROTIME-INR - Normal   APTT - Normal   COMPREHENSIVE METABOLIC PANEL   CBC AND DIFFERENTIAL    Narrative:     The following orders were created for panel order CBC & Differential.  Procedure                               Abnormality         Status                     ---------                               -----------         ------                      CBC Auto Differential[[id number]]        Abnormal            Final result                 Please view results for these tests on the individual orders.       EKG:   No orders to display       Meds given in ED:   Medications   Tetanus-Diphth-Acell Pertussis (BOOSTRIX) injection 0.5 mL (0.5 mL Intramuscular Given 10/25/23 1722)   Lido-EPINEPHrine-Tetracaine 4-0.18-0.5 % gel 3 mL (3 mL Topical Given 10/25/23 1742)   acetaminophen (TYLENOL) tablet 1,000 mg (1,000 mg Oral Given 10/25/23 1722)   labetalol (NORMODYNE,TRANDATE) injection 10 mg (10 mg Intravenous Given 10/25/23 2124)       Imaging results:  CT Head Without Contrast    Result Date: 10/25/2023  1. Small subdural hematoma along the rightward aspect of the falx as described. 2. Mild to moderate atrophy and small vessel white matter ischemic disease. 3. Nasal bone fracture. Please additional dictation for the CT of the facial bones.  CT of the facial bones without contrast  Axial images were obtained through the facial bones. Sagittal and coronal reconstruction images were reviewed.  There is minimally displaced anterior nasal bone fracture. No other facial bone fractures are seen. Nasal septum is midline. There is moderately severe opacification of the right maxillary sinus consistent with sinusitis. Remainder of the sinuses appear clear.  IMPRESSION: 1. Mildly displaced nasal bone fracture. 2. Right maxillary sinusitis. 3. No other facial bone fractures are seen.  CT of the cervical spine without contrast  Axial images were obtained from the skull base to the upper thoracic spine. Sagittal and coronal reconstruction images were reviewed.  There is degenerative disease of the C1-C2 articulation. There is mild C6-7 spondylosis with probable degenerative endplate change along the inferior aspect of C6. No significant subluxation is seen. No cervical spine fractures are seen.  IMPRESSION: 1. Mild cervical degenerative disease. 2. No acute bony abnormality is  seen.     Radiation dose reduction techniques were utilized, including automated exposure control and exposure modulation based on body size.       CT Cervical Spine Without Contrast    Result Date: 10/25/2023  1. Small subdural hematoma along the rightward aspect of the falx as described. 2. Mild to moderate atrophy and small vessel white matter ischemic disease. 3. Nasal bone fracture. Please additional dictation for the CT of the facial bones.  CT of the facial bones without contrast  Axial images were obtained through the facial bones. Sagittal and coronal reconstruction images were reviewed.  There is minimally displaced anterior nasal bone fracture. No other facial bone fractures are seen. Nasal septum is midline. There is moderately severe opacification of the right maxillary sinus consistent with sinusitis. Remainder of the sinuses appear clear.  IMPRESSION: 1. Mildly displaced nasal bone fracture. 2. Right maxillary sinusitis. 3. No other facial bone fractures are seen.  CT of the cervical spine without contrast  Axial images were obtained from the skull base to the upper thoracic spine. Sagittal and coronal reconstruction images were reviewed.  There is degenerative disease of the C1-C2 articulation. There is mild C6-7 spondylosis with probable degenerative endplate change along the inferior aspect of C6. No significant subluxation is seen. No cervical spine fractures are seen.  IMPRESSION: 1. Mild cervical degenerative disease. 2. No acute bony abnormality is seen.     Radiation dose reduction techniques were utilized, including automated exposure control and exposure modulation based on body size.       CT Facial Bones Without Contrast    Result Date: 10/25/2023  1. Small subdural hematoma along the rightward aspect of the falx as described. 2. Mild to moderate atrophy and small vessel white matter ischemic disease. 3. Nasal bone fracture. Please additional dictation for the CT of the facial bones.  CT  of the facial bones without contrast  Axial images were obtained through the facial bones. Sagittal and coronal reconstruction images were reviewed.  There is minimally displaced anterior nasal bone fracture. No other facial bone fractures are seen. Nasal septum is midline. There is moderately severe opacification of the right maxillary sinus consistent with sinusitis. Remainder of the sinuses appear clear.  IMPRESSION: 1. Mildly displaced nasal bone fracture. 2. Right maxillary sinusitis. 3. No other facial bone fractures are seen.  CT of the cervical spine without contrast  Axial images were obtained from the skull base to the upper thoracic spine. Sagittal and coronal reconstruction images were reviewed.  There is degenerative disease of the C1-C2 articulation. There is mild C6-7 spondylosis with probable degenerative endplate change along the inferior aspect of C6. No significant subluxation is seen. No cervical spine fractures are seen.  IMPRESSION: 1. Mild cervical degenerative disease. 2. No acute bony abnormality is seen.     Radiation dose reduction techniques were utilized, including automated exposure control and exposure modulation based on body size.        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

## 2023-10-26 NOTE — NURSING NOTE
Patient transferred from ED, A and O x 4. Was able to stand and pivot to room bed without difficulties.  Vital signs taken and recorded and placed on telemetry. Call light in reach.

## 2023-10-26 NOTE — NURSING NOTE
Patient is alert and oriented x 4. She has headache 5/10. No vision changes, no dizziness, no nausea. All extremities are strong and equal. Hand grasps strong. Bobby ROSALES.

## 2023-10-26 NOTE — DISCHARGE SUMMARY
Patient Name: Vanessa Dorsey  : 1950  MRN: [id number]    Date of Admission: 10/25/2023  Date of Discharge:  10/26/2023  Primary Care Physician: Provider, No Known      Chief Complaint:   Fall and Head Injury      Discharge Diagnoses     Active Hospital Problems    Diagnosis  POA    **Subdural hematoma [S06.5XAA]  Yes    Nasal bone fracture [S02.2XXA]  Yes    Hypothyroidism [E03.9]  Yes    Essential hypertension [I10]  Yes    Type 2 diabetes mellitus with hyperglycemia [E11.65]  Yes      Resolved Hospital Problems   No resolved problems to display.        Hospital Course     Ms. Dorsey is a 73 y.o. female with a history of type 2 diabetes, hypertension, hypothyroidism who presented to [facility] initially complaining of a mechanical fall resulting in a facial strike.  Please see the admitting history and physical for further details.  She was found to have a laceration of her left eye brow, a minimally displaced nasal fracture, and a small subdural hematoma and was admitted to the hospital for further evaluation and treatment.      She was seen in consultation by neurosurgery and a repeat head CT demonstrated stability of the hematoma. She was cleared for discharge and is to follow up with neurosurgery in 3 weeks for a repeat head CT.     With regard to her nasal fracture, ENT recommended outpatient follow up and so a referral has been placed in Psychiatric.     Her lacerated eye brow was repaired in the ED.    Day of Discharge     Subjective:  No events overnight. Her repeat head CT this morning showed stability of the subdural hematoma    Physical Exam:  Temp:  [97.6 °F (36.4 °C)-98.2 °F (36.8 °C)] 98.2 °F (36.8 °C)  Heart Rate:  [] 96  Resp:  [18] 18  BP: (128-160)/(74-96) 145/74  Body mass index is 21.8 kg/m².  Physical Exam  HENT:      Head:      Comments: Left eye brow laceration sutured     Nose:      Comments: Bandage over nose  Cardiovascular:      Rate and Rhythm: Normal rate and  regular rhythm.      Heart sounds: Normal heart sounds.   Pulmonary:      Effort: Pulmonary effort is normal.      Breath sounds: Normal breath sounds.   Abdominal:      General: Bowel sounds are normal.      Palpations: Abdomen is soft.   Musculoskeletal:         General: No tenderness.      Right lower leg: No edema.      Left lower leg: No edema.   Neurological:      Mental Status: She is alert.   Psychiatric:         Mood and Affect: Mood normal.         Behavior: Behavior normal.         Consultants     Consult Orders (all) (From admission, onward)       Start     Ordered    10/26/23 0859  Inpatient ENT Consult  Once        Specialty:  Otolaryngology  Provider:  Toribio Davis MD    10/26/23 0858    10/25/23 2140  Inpatient Neurosurgery Consult  Once        Specialty:  Neurosurgery  Provider:  Aleksander Boyle MD    10/25/23 2150    10/25/23 2053  [facility] (on-call MD unless specified) Details  Once        Specialty:  Hospitalist  Provider:  (Not yet assigned)    10/25/23 2052    10/25/23 2026  Neurosurgery (on-call MD unless specified)  Once        Specialty:  Neurosurgery  Provider:  (Not yet assigned)    10/25/23 2025                  Procedures     Imaging Results (All)       Procedure Component Value Units Date/Time    CT Head Without Contrast [[id number]] Collected: 10/26/23 0856     Updated: 10/26/23 0904    Narrative:      CT HEAD WO CONTRAST-     INDICATIONS: Right parafalcine subdural hematoma.     TECHNIQUE: Radiation dose reduction techniques were utilized, including  automated exposure control and exposure modulation based on body size.  Noncontrast head CT     COMPARISON: 10/25/2023     FINDINGS:     Stable right parafalcine 8 mm extra-axial hyperdensity, axial image 33,  may represent localized subdural hematoma. Alternative possibility of a  meningioma or other lesion is not excluded (could be further  characterized with enhanced brain MRI, interval follow-up can  characterize change).     No new  areas of intracranial hemorrhage, midline shift or mass effect.  No acute territorial infarct is identified.     Mild to moderate periventricular hypodensities suggest chronic small  vessel ischemic change in a patient this age.     Arterial calcifications are seen at the base of the brain.     Ventricles, cisterns, cerebral sulci are unremarkable for patient age.     Dense layering fluid is again present in the right maxillary sinus. The  visualized paranasal sinuses, orbits, mastoid air cells are otherwise  unremarkable. Subcutaneous soft tissue gas at the forehead suggests  sequela of laceration or could be evidence of infection, correlate  clinically.          Impression:         Stable right parafalcine hyperdense focus, as described, follow-up  evaluation advised as indicated.     Subcutaneous soft tissue gas of the forehead. Paranasal sinus disease.     This report was finalized on 10/26/2023 9:01 AM by Dr. Fred Gomez M.D on Workstation: [id number]       CT Head Without Contrast [[id number]] Collected: 10/25/23 2019     Updated: 10/25/23 2019    Narrative:      HISTORY: Head and facial bone injury. Neck injury with neck pain.     CT of the brain without contrast 10/25/2023     Axial images were obtained through the brain without intravenous  contrast.     There is a small focal area of high attenuation along the rightward  aspect of the falx measuring approximately 8 mm this is consistent with  a tiny subdural hemorrhage.     There is mild to moderate diffuse atrophy. Decreased attenuation of the  periventricular white matter is seen bilaterally consistent with small  vessel white matter ischemic disease. No other areas of hemorrhage are  seen. There is mucosal thickening in the right maxillary sinus.  Minimally displaced anterior nasal bone fracture is seen.       Impression:      1. Small subdural hematoma along the rightward aspect of the falx as  described.  2. Mild to moderate atrophy and small  vessel white matter ischemic  disease.  3. Nasal bone fracture. Please additional dictation for the CT of the  facial bones.     CT of the facial bones without contrast     Axial images were obtained through the facial bones. Sagittal and  coronal reconstruction images were reviewed.     There is minimally displaced anterior nasal bone fracture. No other  facial bone fractures are seen. Nasal septum is midline. There is  moderately severe opacification of the right maxillary sinus consistent  with sinusitis. Remainder of the sinuses appear clear.     IMPRESSION:  1. Mildly displaced nasal bone fracture.  2. Right maxillary sinusitis.  3. No other facial bone fractures are seen.     CT of the cervical spine without contrast     Axial images were obtained from the skull base to the upper thoracic  spine. Sagittal and coronal reconstruction images were reviewed.     There is degenerative disease of the C1-C2 articulation. There is mild  C6-7 spondylosis with probable degenerative endplate change along the  inferior aspect of C6. No significant subluxation is seen. No cervical  spine fractures are seen.     IMPRESSION:  1. Mild cervical degenerative disease.  2. No acute bony abnormality is seen.              Radiation dose reduction techniques were utilized, including automated  exposure control and exposure modulation based on body size.          CT Cervical Spine Without Contrast [[id number]] Collected: 10/25/23 2019     Updated: 10/25/23 2019    Narrative:      HISTORY: Head and facial bone injury. Neck injury with neck pain.     CT of the brain without contrast 10/25/2023     Axial images were obtained through the brain without intravenous  contrast.     There is a small focal area of high attenuation along the rightward  aspect of the falx measuring approximately 8 mm this is consistent with  a tiny subdural hemorrhage.     There is mild to moderate diffuse atrophy. Decreased attenuation of the  periventricular  white matter is seen bilaterally consistent with small  vessel white matter ischemic disease. No other areas of hemorrhage are  seen. There is mucosal thickening in the right maxillary sinus.  Minimally displaced anterior nasal bone fracture is seen.       Impression:      1. Small subdural hematoma along the rightward aspect of the falx as  described.  2. Mild to moderate atrophy and small vessel white matter ischemic  disease.  3. Nasal bone fracture. Please additional dictation for the CT of the  facial bones.     CT of the facial bones without contrast     Axial images were obtained through the facial bones. Sagittal and  coronal reconstruction images were reviewed.     There is minimally displaced anterior nasal bone fracture. No other  facial bone fractures are seen. Nasal septum is midline. There is  moderately severe opacification of the right maxillary sinus consistent  with sinusitis. Remainder of the sinuses appear clear.     IMPRESSION:  1. Mildly displaced nasal bone fracture.  2. Right maxillary sinusitis.  3. No other facial bone fractures are seen.     CT of the cervical spine without contrast     Axial images were obtained from the skull base to the upper thoracic  spine. Sagittal and coronal reconstruction images were reviewed.     There is degenerative disease of the C1-C2 articulation. There is mild  C6-7 spondylosis with probable degenerative endplate change along the  inferior aspect of C6. No significant subluxation is seen. No cervical  spine fractures are seen.     IMPRESSION:  1. Mild cervical degenerative disease.  2. No acute bony abnormality is seen.              Radiation dose reduction techniques were utilized, including automated  exposure control and exposure modulation based on body size.          CT Facial Bones Without Contrast [[id number]] Collected: 10/25/23 2019     Updated: 10/25/23 2019    Narrative:      HISTORY: Head and facial bone injury. Neck injury with neck pain.     CT  of the brain without contrast 10/25/2023     Axial images were obtained through the brain without intravenous  contrast.     There is a small focal area of high attenuation along the rightward  aspect of the falx measuring approximately 8 mm this is consistent with  a tiny subdural hemorrhage.     There is mild to moderate diffuse atrophy. Decreased attenuation of the  periventricular white matter is seen bilaterally consistent with small  vessel white matter ischemic disease. No other areas of hemorrhage are  seen. There is mucosal thickening in the right maxillary sinus.  Minimally displaced anterior nasal bone fracture is seen.       Impression:      1. Small subdural hematoma along the rightward aspect of the falx as  described.  2. Mild to moderate atrophy and small vessel white matter ischemic  disease.  3. Nasal bone fracture. Please additional dictation for the CT of the  facial bones.     CT of the facial bones without contrast     Axial images were obtained through the facial bones. Sagittal and  coronal reconstruction images were reviewed.     There is minimally displaced anterior nasal bone fracture. No other  facial bone fractures are seen. Nasal septum is midline. There is  moderately severe opacification of the right maxillary sinus consistent  with sinusitis. Remainder of the sinuses appear clear.     IMPRESSION:  1. Mildly displaced nasal bone fracture.  2. Right maxillary sinusitis.  3. No other facial bone fractures are seen.     CT of the cervical spine without contrast     Axial images were obtained from the skull base to the upper thoracic  spine. Sagittal and coronal reconstruction images were reviewed.     There is degenerative disease of the C1-C2 articulation. There is mild  C6-7 spondylosis with probable degenerative endplate change along the  inferior aspect of C6. No significant subluxation is seen. No cervical  spine fractures are seen.     IMPRESSION:  1. Mild cervical degenerative  "disease.  2. No acute bony abnormality is seen.              Radiation dose reduction techniques were utilized, including automated  exposure control and exposure modulation based on body size.                  Pertinent Labs     Results from last 7 days   Lab Units 10/26/23  0625 10/25/23  2055   WBC 10*3/mm3 5.45 9.75   HEMOGLOBIN g/dL 10.9* 11.9*   PLATELETS 10*3/mm3 259 302     Results from last 7 days   Lab Units 10/26/23  0625 10/25/23  2055   SODIUM mmol/L 139 137   POTASSIUM mmol/L 4.2 4.2   CHLORIDE mmol/L 108* 102   CO2 mmol/L 21.9* 21.1*   BUN mg/dL 20 18   CREATININE mg/dL 0.88 1.04*   GLUCOSE mg/dL 132* 165*   Estimated Creatinine Clearance: 53.3 mL/min (by C-G formula based on SCr of 0.88 mg/dL).  Results from last 7 days   Lab Units 10/25/23  2055   ALBUMIN g/dL 4.3   BILIRUBIN mg/dL <0.2   ALK PHOS U/L 84   AST (SGOT) U/L 18   ALT (SGPT) U/L 13     Results from last 7 days   Lab Units 10/26/23  0625 10/25/23  2055   CALCIUM mg/dL 9.3 9.4   ALBUMIN g/dL  --  4.3               Invalid input(s): \"LDLCALC\"        Test Results Pending at Discharge       Discharge Details        Discharge Medications        Continue These Medications        Instructions Start Date   amLODIPine 5 MG tablet  Commonly known as: NORVASC   5 mg, Oral, Daily      Baqsimi One Pack 3 MG/DOSE powder  Generic drug: Glucagon   3 mg, Nasal      Centrum Adults tablet tablet  Generic drug: multivitamin with minerals   1 tablet, Oral, Daily, HOLD FOR SURGERY      FLUoxetine 20 MG capsule  Commonly known as: PROzac   40 mg, Oral, Daily      glucose 4 GM chewable tablet  Commonly known as: DEX4   8 g, Oral      Glucose Management tablet   4 tablets, Oral      Jardiance 25 MG tablet tablet  Generic drug: empagliflozin       levothyroxine 100 MCG tablet  Commonly known as: SYNTHROID, LEVOTHROID   50 mcg, Oral, Daily      lisinopril 2.5 MG tablet  Commonly known as: PRINIVIL,ZESTRIL       lovastatin 40 MG tablet  Commonly known as: MEVACOR   " 40 mg, Oral, Daily      metFORMIN 1000 MG tablet  Commonly known as: GLUCOPHAGE   500 mg, Oral, 2 Times Daily      nateglinide 120 MG tablet  Commonly known as: STARLIX              Stop These Medications      aspirin 325 MG EC tablet     cyanocobalamin 1000 MCG/ML injection     meloxicam 15 MG tablet  Commonly known as: MOBIC     metoprolol succinate XL 25 MG 24 hr tablet  Commonly known as: TOPROL-XL     nitrofurantoin (macrocrystal-monohydrate) 100 MG capsule  Commonly known as: MACROBID     Ozempic (0.25 or 0.5 MG/DOSE) 2 MG/1.5ML solution pen-injector  Generic drug: Semaglutide(0.25 or 0.5MG/DOS)     Stool Softener 100 MG capsule  Generic drug: docusate sodium     TEPEZZA IV              Allergies   Allergen Reactions    Ciprofloxacin     Levemir [Insulin Detemir] Hives         Discharge Disposition:  Home or Self Care    Discharge Diet:  Diet Order   Procedures    Diet: Regular/House Diet; Texture: Regular Texture (IDDSI 7); Fluid Consistency: Thin (IDDSI 0)       Discharge Activity:   Activity Instructions       Activity as Tolerated              CODE STATUS:    Code Status and Medical Interventions:   Ordered at: 10/25/23 1250     Code Status (Patient has no pulse and is not breathing):    CPR (Attempt to Resuscitate)     Medical Interventions (Patient has pulse or is breathing):    Full Support       No future appointments.  Additional Instructions for the Follow-ups that You Need to Schedule       Call MD With Problems / Concerns   As directed      Instructions: return to the hospital if you experience chest pain, shortness of breath, abdominal pain, nausea, vomiting, fevers, sweats, chills, or worsening of your symptoms    Order Comments: Instructions: return to the hospital if you experience chest pain, shortness of breath, abdominal pain, nausea, vomiting, fevers, sweats, chills, or worsening of your symptoms         Discharge Follow-up with Specialty: neurosurgery 3 weeks or as otherwise directed   As  directed      Specialty: neurosurgery 3 weeks or as otherwise directed               Follow-up Information       Aleksander Boyle MD Follow up in 3 week(s).    Specialty: Neurosurgery  Why: Someone will call you to set up appointment, you will need to get a repeat CT Head prior to appointment  Contact information:  [address]  [facility] [address]  [place] [id number] [phone]               Taco Trinh MD .    Specialty: Otolaryngology  Contact information:  [address]  [facility] [address]  [place] [address]  [phone]               Provider, No Known .    Contact information:  [place] [address]  [phone]                             Additional Instructions for the Follow-ups that You Need to Schedule       Call MD With Problems / Concerns   As directed      Instructions: return to the hospital if you experience chest pain, shortness of breath, abdominal pain, nausea, vomiting, fevers, sweats, chills, or worsening of your symptoms    Order Comments: Instructions: return to the hospital if you experience chest pain, shortness of breath, abdominal pain, nausea, vomiting, fevers, sweats, chills, or worsening of your symptoms         Discharge Follow-up with Specialty: neurosurgery 3 weeks or as otherwise directed   As directed      Specialty: neurosurgery 3 weeks or as otherwise directed            Time Spent on Discharge:  Greater than 30 minutes      Joesph Altman MD  [facility]  10/26/23  13:27 EDT

## 2023-10-26 NOTE — DISCHARGE INSTRUCTIONS
NO NSAIDS UNTIL CLEARED BY NEUROSURGERY. THIS INCLUDES; ASPIRIN, MOTRIN, IBUPROFEN, ALLEVE...TYLENOL IS SAFE FOR PAIN.

## 2023-10-26 NOTE — CONSULTS
[facility] CONSULT NOTE    Patient name: Vanessa Dorsey  Referring Provider: Chana Gibbs PA-C  Reason for Consultation: SDH    Patient Care Team:  Provider, No Known as PCP - Mayuri Molina MD (Geriatric Medicine)    Chief complaint: Fall, head injury    Subjective .   History of present illness:    Patient is a 73 y.o. right handed female with history of DM type II, HTN, hypothyroidism.  Patient presented to the ER on 10/25/2023 after a mechanical fall while out walking with her granddaughters and dogs.  She states the sidewalk was uneven causing her to trip, fall forward and strike her face on the ground.  CT in the ER revealed small right subdural hematoma.  Patient complains of a mild intermittent headache, states that Tylenol does relieve it.  Patient denies blurry vision, dizziness, neck or back pain, extremity injury.  She had no LOC or nausea ,vomiting after the fall.  She is not anticoagulated.      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    Drug use: Yes     Types: Marijuana     Comment: IN HER 20'S       full time job, works at [employer]    History  PAST MEDICAL HISTORY  Past Medical History:   Diagnosis Date    Arthritis     Diabetes mellitus     Disease of thyroid gland     Elevated cholesterol     Hyperlipidemia     Hypertension     Right knee pain        PAST SURGICAL HISTORY  Past Surgical History:   Procedure Laterality Date    COLONOSCOPY      HYSTERECTOMY      KNEE ARTHROSCOPY MENISCUS TRANSPLANT Right     AUG 2019    LASIK Bilateral     SHOULDER ARTHROSCOPY Bilateral     TOTAL KNEE ARTHROPLASTY Right  10/8/2020    Procedure: RIGHT TOTAL KNEE ARTHROPLASTY;  Surgeon: Koko Small MD;  Location: [facility] OR;  Service: Orthopedics;  Laterality: Right;    TUBAL ABDOMINAL LIGATION         FAMILY HISTORY  Family History   Problem Relation Age of Onset    Diabetes Mother     COPD Mother     Diabetes Father     COPD Sister     Malig Hyperthermia Neg Hx        Allergies:  Ciprofloxacin and Levemir [insulin detemir]    MEDICATIONS:  Medications Prior to Admission   Medication Sig Dispense Refill Last Dose    amLODIPine (NORVASC) 5 MG tablet Take 1 tablet by mouth Daily.   10/25/2023    Baqsimi One Pack 3 MG/DOSE powder 3 mg into the nostril(s) as directed by provider.   Past Week    empagliflozin (Jardiance) 25 MG tablet tablet    10/25/2023    FLUoxetine (PROzac) 20 MG capsule Take 2 capsules by mouth Daily.   10/25/2023    levothyroxine (SYNTHROID, LEVOTHROID) 100 MCG tablet Take 0.5 tablets by mouth Daily.   10/25/2023    lisinopril (PRINIVIL,ZESTRIL) 2.5 MG tablet    10/25/2023    lovastatin (MEVACOR) 40 MG tablet Take 1 tablet by mouth Daily.   10/25/2023    meloxicam (MOBIC) 15 MG tablet Take 1 tablet by mouth Daily.   10/25/2023    metFORMIN (GLUCOPHAGE) 1000 MG tablet Take 0.5 tablets by mouth 2 (Two) Times a Day.   10/25/2023    Multiple Vitamins-Minerals (CENTRUM ADULTS) tablet Take 1 tablet by mouth Daily. HOLD FOR SURGERY   10/25/2023    nateglinide (STARLIX) 120 MG tablet    10/25/2023    aspirin 325 MG EC tablet Take 1 tablet by mouth 2 (Two) Times a Day With Meals. 60 tablet 0 More than a month    cyanocobalamin 1000 MCG/ML injection Inject 1 mL into the appropriate muscle as directed by prescriber Every 7 (Seven) Days.   Unknown    docusate sodium 100 MG capsule Take 1 capsule by mouth 2 (Two) Times a Day As Needed for Constipation. 30 each 1 More than a month    glucose (DEX4) 4 GM chewable tablet Chew 2 tablets.   More than a month    metoprolol succinate XL (TOPROL-XL) 25 MG 24 hr tablet Take 1  tablet by mouth Daily.   More than a month    nitrofurantoin, macrocrystal-monohydrate, (MACROBID) 100 MG capsule Take  by mouth 2 (Two) Times a Day.   More than a month    Nutritional Supplements (Glucose Management) tablet Take 4 tablets by mouth.   More than a month    Semaglutide,0.25 or 0.5MG/DOS, (Ozempic, 0.25 or 0.5 MG/DOSE,) 2 MG/1.5ML solution pen-injector INJECT 0.25MG INTO THE SKIN EVERY 7 DAYS FOR 30 DAYS THEN TAKE 0.5 MG EVERY 7 DAYS   More than a month    Teprotumumab-trbw (TEPEZZA IV)    More than a month       Current Facility-Administered Medications:     acetaminophen (TYLENOL) tablet 650 mg, 650 mg, Oral, Q4H PRN, 650 mg at 10/26/23 0830 **OR** acetaminophen (TYLENOL) 160 MG/5ML oral solution 650 mg, 650 mg, Oral, Q4H PRN **OR** acetaminophen (TYLENOL) suppository 650 mg, 650 mg, Rectal, Q4H PRN, Koko Akbar MD    sennosides-docusate (PERICOLACE) 8.6-50 MG per tablet 2 tablet, 2 tablet, Oral, BID **AND** polyethylene glycol (MIRALAX) packet 17 g, 17 g, Oral, Daily PRN **AND** bisacodyl (DULCOLAX) EC tablet 5 mg, 5 mg, Oral, Daily PRN **AND** bisacodyl (DULCOLAX) suppository 10 mg, 10 mg, Rectal, Daily PRN, Koko Akbar MD    dextrose (D50W) (25 g/50 mL) IV injection 25 g, 25 g, Intravenous, Q15 Min PRN, Koko Akbar MD    dextrose (GLUTOSE) oral gel 15 g, 15 g, Oral, Q15 Min PRN, Koko Akbar MD    glucagon (GLUCAGEN) injection 1 mg, 1 mg, Intramuscular, Q15 Min PRN, Koko Akbar MD    hydrALAZINE (APRESOLINE) injection 10 mg, 10 mg, Intravenous, Q6H PRN, Koko Akbar MD    insulin lispro (HUMALOG/ADMELOG) injection 2-7 Units, 2-7 Units, Subcutaneous, 4x Daily AC & at Bedtime, Koko Akbar MD    nitroglycerin (NITROSTAT) SL tablet 0.4 mg, 0.4 mg, Sublingual, Q5 Min PRN, Koko Akbar MD    ondansetron (ZOFRAN) injection 4 mg, 4 mg, Intravenous, Q6H PRN, Koko Akbar MD     sodium chloride 0.9 % flush 10 mL, 10 mL, Intravenous, Q12H, Koko Akbar MD, 10 mL at 10/26/23 0825    sodium chloride 0.9 % flush 10 mL, 10 mL, Intravenous, PRN, Koko Akbar MD    sodium chloride 0.9 % infusion 40 mL, 40 mL, Intravenous, PRN, Koko Akbar MD      Review of Systems  Review of Systems   Eyes:  Negative for visual disturbance.   Gastrointestinal:  Negative for nausea and vomiting.   Musculoskeletal:  Negative for back pain and neck pain.   Skin:  Positive for wound (laceration above left eyebrow).   Neurological:  Positive for headaches (intermittent). Negative for dizziness, syncope, weakness and light-headedness.   Psychiatric/Behavioral:  The patient is not nervous/anxious.        Objective     Physical Exam:  Physical Exam  Vitals reviewed.   Constitutional:       Appearance: Normal appearance.   Musculoskeletal:      Cervical back: Normal.      Thoracic back: Normal.      Lumbar back: Normal.   Skin:     General: Skin is warm and dry.      Comments: Sutured laceration above left eyebrow   Neurological:      Mental Status: She is alert and oriented to person, place, and time.      Cranial Nerves: Cranial nerves 2-12 are intact.      Coordination: Finger-Nose-Finger Test and Heel to Shin Test normal.   Psychiatric:         Mood and Affect: Mood normal.         Speech: Speech normal.       Neurologic Exam     Mental Status   Oriented to person, place, and time.   Attention: normal. Concentration: normal.   Speech: speech is normal   Level of consciousness: alert  Knowledge: good.   Normal comprehension.     Cranial Nerves   Cranial nerves II through XII intact.     Motor Exam   Muscle bulk: normal  Overall muscle tone: normal    Sensory Exam   Light touch normal.     Gait, Coordination, and Reflexes     Coordination   Finger to nose coordination: normal  Heel to shin coordination: normal  Gait not tested d/t fall risk         Results  Review:  LABS:    Admission on 10/25/2023   Component Date Value Ref Range Status    Glucose 10/25/2023 165 (H)  65 - 99 mg/dL Final    BUN 10/25/2023 18  8 - 23 mg/dL Final    Creatinine 10/25/2023 1.04 (H)  0.57 - 1.00 mg/dL Final    Sodium 10/25/2023 137  136 - 145 mmol/L Final    Potassium 10/25/2023 4.2  3.5 - 5.2 mmol/L Final    Chloride 10/25/2023 102  98 - 107 mmol/L Final    CO2 10/25/2023 21.1 (L)  22.0 - 29.0 mmol/L Final    Calcium 10/25/2023 9.4  8.6 - 10.5 mg/dL Final    Total Protein 10/25/2023 7.1  6.0 - 8.5 g/dL Final    Albumin 10/25/2023 4.3  3.5 - 5.2 g/dL Final    ALT (SGPT) 10/25/2023 13  1 - 33 U/L Final    AST (SGOT) 10/25/2023 18  1 - 32 U/L Final    Alkaline Phosphatase 10/25/2023 84  39 - 117 U/L Final    Total Bilirubin 10/25/2023 <0.2  0.0 - 1.2 mg/dL Final    Globulin 10/25/2023 2.8  gm/dL Final    A/G Ratio 10/25/2023 1.5  g/dL Final    BUN/Creatinine Ratio 10/25/2023 17.3  7.0 - 25.0 Final    Anion Gap 10/25/2023 13.9  5.0 - 15.0 mmol/L Final    eGFR 10/25/2023 57.6 (L)  >60.0 mL/min/1.73 Final    Protime 10/25/2023 13.0  11.7 - 14.2 Seconds Final    INR 10/25/2023 0.97  0.90 - 1.10 Final    PTT 10/25/2023 29.4  22.7 - 35.4 seconds Final    WBC 10/25/2023 9.75  3.40 - 10.80 10*3/mm3 Final    RBC 10/25/2023 4.05  3.77 - 5.28 10*6/mm3 Final    Hemoglobin 10/25/2023 11.9 (L)  12.0 - 15.9 g/dL Final    Hematocrit 10/25/2023 34.1  34.0 - 46.6 % Final    MCV 10/25/2023 84.2  79.0 - 97.0 fL Final    MCH 10/25/2023 29.4  26.6 - 33.0 pg Final    MCHC 10/25/2023 34.9  31.5 - 35.7 g/dL Final    RDW 10/25/2023 13.3  12.3 - 15.4 % Final    RDW-SD 10/25/2023 41.3  37.0 - 54.0 fl Final    MPV 10/25/2023 9.4  6.0 - 12.0 fL Final    Platelets 10/25/2023 302  140 - 450 10*3/mm3 Final    Neutrophil % 10/25/2023 74.0  42.7 - 76.0 % Final    Lymphocyte % 10/25/2023 16.0 (L)  19.6 - 45.3 % Final    Monocyte % 10/25/2023 8.4  5.0 - 12.0 % Final    Eosinophil % 10/25/2023 0.7  0.3 - 6.2 % Final    Basophil %  10/25/2023 0.7  0.0 - 1.5 % Final    Immature Grans % 10/25/2023 0.2  0.0 - 0.5 % Final    Neutrophils, Absolute 10/25/2023 7.21 (H)  1.70 - 7.00 10*3/mm3 Final    Lymphocytes, Absolute 10/25/2023 1.56  0.70 - 3.10 10*3/mm3 Final    Monocytes, Absolute 10/25/2023 0.82  0.10 - 0.90 10*3/mm3 Final    Eosinophils, Absolute 10/25/2023 0.07  0.00 - 0.40 10*3/mm3 Final    Basophils, Absolute 10/25/2023 0.07  0.00 - 0.20 10*3/mm3 Final    Immature Grans, Absolute 10/25/2023 0.02  0.00 - 0.05 10*3/mm3 Final    nRBC 10/25/2023 0.0  0.0 - 0.2 /100 WBC Final    Glucose 10/26/2023 132 (H)  65 - 99 mg/dL Final    BUN 10/26/2023 20  8 - 23 mg/dL Final    Creatinine 10/26/2023 0.88  0.57 - 1.00 mg/dL Final    Sodium 10/26/2023 139  136 - 145 mmol/L Final    Potassium 10/26/2023 4.2  3.5 - 5.2 mmol/L Final    Slight hemolysis detected by analyzer. Results may be affected.    Chloride 10/26/2023 108 (H)  98 - 107 mmol/L Final    CO2 10/26/2023 21.9 (L)  22.0 - 29.0 mmol/L Final    Calcium 10/26/2023 9.3  8.6 - 10.5 mg/dL Final    BUN/Creatinine Ratio 10/26/2023 22.7  7.0 - 25.0 Final    Anion Gap 10/26/2023 9.1  5.0 - 15.0 mmol/L Final    eGFR 10/26/2023 70.4  >60.0 mL/min/1.73 Final    WBC 10/26/2023 5.45  3.40 - 10.80 10*3/mm3 Final    RBC 10/26/2023 3.88  3.77 - 5.28 10*6/mm3 Final    Hemoglobin 10/26/2023 10.9 (L)  12.0 - 15.9 g/dL Final    Hematocrit 10/26/2023 33.0 (L)  34.0 - 46.6 % Final    MCV 10/26/2023 85.1  79.0 - 97.0 fL Final    MCH 10/26/2023 28.1  26.6 - 33.0 pg Final    MCHC 10/26/2023 33.0  31.5 - 35.7 g/dL Final    RDW 10/26/2023 13.3  12.3 - 15.4 % Final    RDW-SD 10/26/2023 41.7  37.0 - 54.0 fl Final    MPV 10/26/2023 9.5  6.0 - 12.0 fL Final    Platelets 10/26/2023 259  140 - 450 10*3/mm3 Final    Neutrophil % 10/26/2023 57.3  42.7 - 76.0 % Final    Lymphocyte % 10/26/2023 26.8  19.6 - 45.3 % Final    Monocyte % 10/26/2023 11.6  5.0 - 12.0 % Final    Eosinophil % 10/26/2023 2.8  0.3 - 6.2 % Final    Basophil %  10/26/2023 1.3  0.0 - 1.5 % Final    Immature Grans % 10/26/2023 0.2  0.0 - 0.5 % Final    Neutrophils, Absolute 10/26/2023 3.13  1.70 - 7.00 10*3/mm3 Final    Lymphocytes, Absolute 10/26/2023 1.46  0.70 - 3.10 10*3/mm3 Final    Monocytes, Absolute 10/26/2023 0.63  0.10 - 0.90 10*3/mm3 Final    Eosinophils, Absolute 10/26/2023 0.15  0.00 - 0.40 10*3/mm3 Final    Basophils, Absolute 10/26/2023 0.07  0.00 - 0.20 10*3/mm3 Final    Immature Grans, Absolute 10/26/2023 0.01  0.00 - 0.05 10*3/mm3 Final    nRBC 10/26/2023 0.0  0.0 - 0.2 /100 WBC Final    Glucose 10/26/2023 91  70 - 130 mg/dL Final    Glucose 10/26/2023 126  70 - 130 mg/dL Final       DIAGNOSTICS:  CT head 10/26/2023-stable appearing right parafalcine subdural hematoma compared to previous scan on 10/25/2023.  No new areas of hemorrhage, midline shift or mass effect.    Results Review:   I reviewed the patient's new clinical results.  I personally viewed the patient's CT head, it was also reviewed by and discussed with Dr Boyle    Vital Signs   Temp:  [97.6 °F (36.4 °C)-98.2 °F (36.8 °C)] 98.2 °F (36.8 °C)  Heart Rate:  [] 97  Resp:  [18] 18  BP: (128-160)/(75-96) 133/79      Assessment & Plan       Subdural hematoma    Essential hypertension    Type 2 diabetes mellitus with hyperglycemia    Hypothyroidism    Nasal bone fracture    73-year-old female with small right subdural hematoma status post mechanical fall yesterday while walking with her granddaughters and dogs.  Patient not anticoagulated.  Exam as noted above with no red flags noted today.  CT head performed this morning stable appearing with no new areas of hemorrhage.  Patient is stable for discharge from neurosurgery standpoint.  She was provided strict instructions on when to return to the ER.  She was also advised that she could call the office for any questions or concerns.  We will plan to see her in the office in 3 weeks with repeat head CT at that time.      PLAN:   -Okay for  "discharge from neurosurgery standpoint  -Office follow-up in 3 weeks with a repeat CT head at that time      I discussed the patient's findings and my recommendations with patient, family, nursing staff, and Dr. Boyle    During patient visit, I utilized appropriate personal protective equipment including gloves.  Appropriate PPE was worn during the entire visit.  Hand hygiene was completed before and after.     Marie Bermudez, APRN  10/26/23  10:47 EDT    \"Dictated utilizing Dragon dictation\".      "

## 2023-10-26 NOTE — THERAPY EVALUATION
Patient Name: Vanessa Dorsey  : 1950    MRN: [id number]                              Today's Date: 10/26/2023       Admit Date: 10/25/2023    Visit Dx:     ICD-10-CM ICD-9-CM   1. Subdural hemorrhage  I62.00 432.1   2. Laceration of left eyebrow, initial encounter  S01.112A 873.42   3. Closed fracture of nasal bone, initial encounter  S02.2XXA 802.0   4. Abrasion of nose, initial encounter  S00.31XA 910.0   5. Fall, initial encounter  W19.XXXA E888.9     Patient Active Problem List   Diagnosis    Hyperglycemia    Essential hypertension    Type 2 diabetes mellitus with hyperglycemia    Hypothyroidism    UTI (urinary tract infection)    Dehydration    Viral illness    Primary osteoarthritis of right knee    Subdural hematoma    Nasal bone fracture     Past Medical History:   Diagnosis Date    Arthritis     Diabetes mellitus     Disease of thyroid gland     Elevated cholesterol     Hyperlipidemia     Hypertension     Right knee pain      Past Surgical History:   Procedure Laterality Date    COLONOSCOPY      HYSTERECTOMY      KNEE ARTHROSCOPY MENISCUS TRANSPLANT Right     AUG 2019    LASIK Bilateral     SHOULDER ARTHROSCOPY Bilateral     TOTAL KNEE ARTHROPLASTY Right 10/8/2020    Procedure: RIGHT TOTAL KNEE ARTHROPLASTY;  Surgeon: Koko Small MD;  Location: [facility];  Service: Orthopedics;  Laterality: Right;    TUBAL ABDOMINAL LIGATION        General Information       Row Name 10/26/23 1422          OT Time and Intention    Document Type discharge evaluation/summary  -MW     Mode of Treatment co-treatment;occupational therapy;physical therapy  PM new order, RN called to have OT/PT see pt to see if safe to discahrge home  -MW       Row Name 10/26/23 1422          General Information    Patient Profile Reviewed yes  -MW     Prior Level of Function independent:  -MW     Existing Precautions/Restrictions no known precautions/restrictions  -MW     Barriers to Rehab none identified  -MW       Row Name  10/26/23 1422          Living Environment    People in Home alone  family nearby  -       Row Name 10/26/23 1422          Cognition    Orientation Status (Cognition) oriented x 4  -               User Key  (r) = Recorded By, (t) = Taken By, (c) = Cosigned By      Initials Name Provider Type     Mirta Suazo OT Occupational Therapist                     Mobility/ADL's       Row Name 10/26/23 1422          Bed Mobility    Bed Mobility sit-supine;supine-sit  -     Supine-Sit [place] (Bed Mobility) independent  -     Sit-Supine [place] (Bed Mobility) independent  -       Row Name 10/26/23 1422          Transfers    Transfers sit-stand transfer;stand-sit transfer;toilet transfer  -[facility] Name 10/26/23 1422          Sit-Stand Transfer    Sit-Stand [place] (Transfers) independent  -       Row Name 10/26/23 1422          Stand-Sit Transfer    Stand-Sit [place] (Transfers) independent  -MW       Row Name 10/26/23 1422          Toilet Transfer    Comment, (Toilet Transfer) no concerns with toilet transfer completed earlier this date  -[facility] Name 10/26/23 1422          Functional Mobility    Functional Mobility- Ind. Level independent  -MW       Row Name 10/26/23 1422          Activities of Daily Living    BADL Assessment/Intervention --  no concerns, pt fully dressed on arrival  -               User Key  (r) = Recorded By, (t) = Taken By, (c) = Cosigned By      Initials Name Provider Type     Mirta Suazo OT Occupational Therapist                   Obj/Interventions       Row Name 10/26/23 1423          Sensory Assessment (Somatosensory)    Sensory Assessment (Somatosensory) UE sensation intact  -[facility] Name 10/26/23 1423          Vision Assessment/Intervention    Visual Impairment/Limitations WFL  -       Row Name 10/26/23 1423          Range of Motion Comprehensive    General Range of Motion bilateral upper extremity ROM WNL  -       Row Name 10/26/23  1423          Strength Comprehensive (MMT)    General Manual Muscle Testing (MMT) Assessment no strength deficits identified  -MW       Row Name 10/26/23 1423          Motor Skills    Motor Skills functional endurance;coordination  -MW     Coordination WFL;bilateral;upper extremity  -MW     Functional Endurance WFL  -MW       Row Name 10/26/23 1423          Balance    Balance Assessment sitting static balance;sitting dynamic balance;sit to stand dynamic balance;standing static balance;standing dynamic balance  -MW     Static Sitting Balance independent  -MW     Dynamic Sitting Balance independent  -MW     Position, Sitting Balance sitting edge of bed;unsupported  -MW     Sit to Stand Dynamic Balance independent  -MW     Static Standing Balance independent  -MW     Dynamic Standing Balance independent  -MW     Position/Device Used, Standing Balance unsupported  -MW     Comment, Balance no LOBs  -MW               User Key  (r) = Recorded By, (t) = Taken By, (c) = Cosigned By      Initials Name Provider Type    MW Mirta Suazo OT Occupational Therapist                   Goals/Plan       Row Name 10/26/23 1426          Transfer Goal 1 (OT)    Activity/Assistive Device (Transfer Goal 1, OT) sit-to-stand/stand-to-sit  -MW     [place] Level/Cues Needed (Transfer Goal 1, OT) independent  -MW     Time Frame (Transfer Goal 1, OT) short term goal (STG);1 day  -MW     Progress/Outcome (Transfer Goal 1, OT) goal met  -MW               User Key  (r) = Recorded By, (t) = Taken By, (c) = Cosigned By      Initials Name Provider Type    MW Mirta Suazo OT Occupational Therapist                   Clinical Impression       Row Name 10/26/23 1423          Pain Assessment    Pretreatment Pain Rating 0/10 - no pain  -MW     Posttreatment Pain Rating 0/10 - no pain  -MW       Row Name 10/26/23 1423          Plan of Care Review    Plan of Care Reviewed With patient;friend  -MW     Progress no change  -MW     Outcome  Evaluation Pt is a 72 yo female admitted following a mechanical fall while walking dogs with her granddaughter. found to have minimally displaced nasal fracture and small subdural hematoma. She presents at her baseline this date with no concerns for d/c home alone, no balance or safety deficits noted, owns all DME needed, observed pt complete upright activity (I) this date. plans home and pt ok to discharge.  -       Row Name 10/26/23 1423          Therapy Assessment/Plan (OT)    Criteria for Skilled Therapeutic Interventions Met (OT) no problems identified which require skilled intervention  -       Row Name 10/26/23 1423          Therapy Plan Review/Discharge Plan (OT)    Anticipated Discharge Disposition (OT) home  -       Row Name 10/26/23 1423          Vital Signs    O2 Delivery Pre Treatment room air  -       Row Name 10/26/23 1423          Positioning and Restraints    Pre-Treatment Position in bed  -MW     Post Treatment Position bed  -MW     In Bed sitting EOB;call light within reach  -MW               User Key  (r) = Recorded By, (t) = Taken By, (c) = Cosigned By      Initials Name Provider Type    Mirta Vazquez, OT Occupational Therapist                   Outcome Measures       Row Name 10/26/23 1426          How much help from another is currently needed...    Putting on and taking off regular lower body clothing? 4  -MW     Bathing (including washing, rinsing, and drying) 4  -MW     Toileting (which includes using toilet bed pan or urinal) 4  -MW     Putting on and taking off regular upper body clothing 4  -MW     Taking care of personal grooming (such as brushing teeth) 4  -MW     Eating meals 4  -MW     AM-PAC 6 Clicks Score (OT) 24  -MW       Row Name 10/26/23 1400 10/26/23 0830       How much help from another person do you currently need...    Turning from your back to your side while in flat bed without using bedrails? 4  -LH 4  -VD    Moving from lying on back to sitting on the  side of a flat bed without bedrails? 4  - 4  -VD    Moving to and from a bed to a chair (including a wheelchair)? 4  - 4  -VD    Standing up from a chair using your arms (e.g., wheelchair, bedside chair)? 4  - 4  -VD    Climbing 3-5 steps with a railing? 4  - 4  -VD    To walk in hospital room? 4  - 4  -VD    AM-PAC 6 Clicks Score (PT) 24  - 24  -VD    Highest level of mobility 8 --> Walked 250 feet or more  - 8 --> Walked 250 feet or more  -VD      Row Name 10/26/23 1426          Modified Era Scale    Modified Era Scale 0 - No Symptoms at all.  -       Row Name 10/26/23 1426          Functional Assessment    Outcome Measure Options AM-PAC 6 Clicks Daily Activity (OT);Modified Anna  -MW               User Key  (r) = Recorded By, (t) = Taken By, (c) = Cosigned By      Initials Name Provider Type     Guera Anaya, PT Physical Therapist    Mirta Vazquez, OT Occupational Therapist    Dianna Mckeon, RN Registered Nurse                    Occupational Therapy Education       Title: PT OT SLP Therapies (In Progress)       Topic: Occupational Therapy (In Progress)       Point: ADL training (Done)       Description:   Instruct learner(s) on proper safety adaptation and remediation techniques during self care or transfers.   Instruct in proper use of assistive devices.                  Learning Progress Summary             Patient Acceptance, E, VU by DEJA at 10/26/2023 1426    Comment: role of OT, d/c rec   Other Acceptance, E, VU by  at 10/26/2023 1426    Comment: role of OT, d/c rec                         Point: Home exercise program (Not Started)       Description:   Instruct learner(s) on appropriate technique for monitoring, assisting and/or progressing therapeutic exercises/activities.                  Learner Progress:  Not documented in this visit.              Point: Precautions (Done)       Description:   Instruct learner(s) on prescribed precautions during self-care and  functional transfers.                  Learning Progress Summary             Patient Acceptance, E, VU by  at 10/26/2023 1426    Comment: role of OT, d/c rec   Other Acceptance, E, VU by  at 10/26/2023 1426    Comment: role of OT, d/c rec                         Point: Body mechanics (Done)       Description:   Instruct learner(s) on proper positioning and spine alignment during self-care, functional mobility activities and/or exercises.                  Learning Progress Summary             Patient Acceptance, E, VU by  at 10/26/2023 1426    Comment: role of OT, d/c rec   Other Acceptance, E, VU by  at 10/26/2023 1426    Comment: role of OT, d/c rec                                         User Key       Initials Effective Dates Name Provider Type Discipline     08/20/21 -  Mirta Suazo OT Occupational Therapist OT                  OT Recommendation and Plan     Plan of Care Review  Plan of Care Reviewed With: patient, friend  Progress: no change  Outcome Evaluation: Pt is a 74 yo female admitted following a mechanical fall while walking dogs with her granddaughter. found to have minimally displaced nasal fracture and small subdural hematoma. She presents at her baseline this date with no concerns for d/c home alone, no balance or safety deficits noted, owns all DME needed, observed pt complete upright activity (I) this date. plans home and pt ok to discharge.     Time Calculation:   Evaluation Complexity (OT)  Review Occupational Profile/Medical/Therapy History Complexity: brief/low complexity  Clinical Decision Making Complexity (OT): problem focused assessment/low complexity  Overall Complexity of Evaluation (OT): low complexity     Time Calculation- OT       Row Name 10/26/23 1428             Time Calculation- OT    OT Start Time 1340  -MW      OT Stop Time 1350  -MW      OT Time Calculation (min) 10 min  -MW      OT Received On 10/26/23  -MW         Untimed Charges    OT Eval/Re-eval Minutes 10   -MW         Total Minutes    Untimed Charges Total Minutes 10  -MW       Total Minutes 10  -MW                User Key  (r) = Recorded By, (t) = Taken By, (c) = Cosigned By      Initials Name Provider Type    Mirta Vazquez OT Occupational Therapist                  Therapy Charges for Today       Code Description Service Date Service Provider Modifiers Qty    [id number] HC OT EVAL LOW COMPLEXITY 2 10/26/2023 Mirta Suazo OT GO 1                 Mirta Suazo OT  10/26/2023

## 2023-10-26 NOTE — H&P
A Admission H&P    Patient Care Team:  Provider, No Known as PCP - Mayuri Molina MD (Geriatric Medicine)    Chief complaint: Mechanical fall, facial trauma    History of Present Illness    This is a 73-year-old female who has a history of type 2 diabetes, hypertension, hypothyroidism who suffered a mechanical fall after tripping over an uneven sidewalk today.  She fell forward striking her face on the ground.  Evaluation in the emergency room revealed a small subdural hematoma, left brow laceration, and minimally displaced nasal fracture.  She denies any loss of consciousness associated with her fall.  Aside from soreness in her face she has no complaints.  Specifically, denies any dizziness, lightheadedness, visual changes.  I have been asked to admit her for further care.  Neurosurgery was consulted via the ER provider who recommend overnight observation tonight and repeat head CT in the morning.  Patient does not take any blood thinners.    Past Medical History:   Diagnosis Date    Arthritis     Diabetes mellitus     Disease of thyroid gland     Elevated cholesterol     Hyperlipidemia     Hypertension     Right knee pain      Past Surgical History:   Procedure Laterality Date    COLONOSCOPY      HYSTERECTOMY      KNEE ARTHROSCOPY MENISCUS TRANSPLANT Right     AUG 2019    LASIK Bilateral     SHOULDER ARTHROSCOPY Bilateral     TOTAL KNEE ARTHROPLASTY Right 10/8/2020    Procedure: RIGHT TOTAL KNEE ARTHROPLASTY;  Surgeon: Koko Small MD;  Location: [facility];  Service: Orthopedics;  Laterality: Right;    TUBAL ABDOMINAL LIGATION       Family History   Problem Relation Age of Onset    Diabetes Mother     COPD Mother     Diabetes Father     COPD Sister     Malig Hyperthermia Neg Hx      Social History     Tobacco Use    Smoking status: Former     Packs/day: 1.00     Years: 40.00     Additional pack years: 0.00     Total pack years: 40.00     Types: Cigarettes    Smokeless tobacco: Never     Tobacco comments:     quit n15 years ago   Substance Use Topics    Alcohol use: No     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   HENT:  Negative for congestion and sore throat.         Facial pain with left brow laceration   Eyes:  Negative for visual disturbance.   Respiratory:  Negative for cough, chest tightness, shortness of breath and wheezing.    Cardiovascular:  Negative for chest pain, palpitations and leg swelling.   Gastrointestinal:  Negative for abdominal distention, abdominal pain, diarrhea, nausea and vomiting.   Endocrine: Negative for polydipsia and polyuria.   Genitourinary:  Negative for difficulty urinating, dysuria, frequency and urgency.   Musculoskeletal:  Negative for arthralgias and myalgias.   Skin:  Negative for color change and rash.   Neurological:  Negative for dizziness and light-headedness.        PHYSICAL EXAM    Vital Signs  tMax 24 hrs:  Temp (24hrs), Av.6 °F (36.4 °C), Min:97.6 °F (36.4 °C), Max:97.6 °F (36.4 °C)    Vitals Ranges:  Temp:  [97.6 °F (36.4 °C)] 97.6 °F (36.4 °C)  Heart Rate:  [] 100  Resp:  [18] 18  BP: (128-160)/(75-96) 145/78    Physical Exam  Vitals and nursing note reviewed.   Constitutional:       General: She is not in acute distress.  HENT:      Head: Normocephalic.      Comments: Left brow laceration has been sutured with excellent approximation.  Nasal splint in place.  Eyes:      Extraocular Movements: Extraocular movements intact.      Pupils: Pupils are equal, round, and reactive to light.   Cardiovascular:      Rate and Rhythm: Normal rate and regular rhythm.   Pulmonary:      Effort: Pulmonary effort is normal. No respiratory distress.      Breath sounds: Normal breath sounds.   Abdominal:      General: There is no distension.       Palpations: Abdomen is soft.      Tenderness: There is no abdominal tenderness.   Musculoskeletal:         General: No swelling or deformity.      Cervical back: Normal range of motion.   Skin:     General: Skin is warm and dry.   Neurological:      General: No focal deficit present.      Mental Status: She is alert and oriented to person, place, and time.         Results Review:  Results from last 7 days   Lab Units 10/25/23  2055   WBC 10*3/mm3 9.75   HEMOGLOBIN g/dL 11.9*   HEMATOCRIT % 34.1   PLATELETS 10*3/mm3 302     Results from last 7 days   Lab Units 10/25/23  2055   SODIUM mmol/L 137   POTASSIUM mmol/L 4.2   CHLORIDE mmol/L 102   CO2 mmol/L 21.1*   BUN mg/dL 18   CREATININE mg/dL 1.04*   CALCIUM mg/dL 9.4   BILIRUBIN mg/dL <0.2   ALK PHOS U/L 84   ALT (SGPT) U/L 13   AST (SGOT) U/L 18   GLUCOSE mg/dL 165*        I reviewed the patient's new clinical results.  I reviewed the patient's new imaging results and agree with the interpretation.        Active Hospital Problems    Diagnosis  POA    **Subdural hematoma [S06.5XAA]  Yes    Nasal bone fracture [S02.2XXA]  Unknown    Hypothyroidism [E03.9]  Yes    Essential hypertension [I10]  Yes    Type 2 diabetes mellitus with hyperglycemia [E11.65]  Yes      Resolved Hospital Problems   No resolved problems to display.       Assessment & Plan    The patient will be admitted for observation overnight tonight.  Neurosurgery has been consulted.  We will order for repeat head CT to be performed at 6 AM tomorrow morning.  Serial neurochecks.  Blood pressure control with goal of systolic remaining below 140, as per neurosurgery recommendations.  She was given 10 mg of IV labetalol in the emergency room and we will see how she responds to this with as needed hydralazine available thereafter.  Will initiate sliding scale for control of her blood sugars and reconcile her insulin orders once available.  Hold metformin for now.  We will restart her oral antihypertensive  agents.  Additional plans based on her clinical course.    I discussed the patients findings and my recommendations with patient and family      Koko Akbar MD  10/25/23  21:51 EDT

## 2023-10-26 NOTE — PLAN OF CARE
Goal Outcome Evaluation:  Plan of Care Reviewed With: patient, friend        Progress: no change  Outcome Evaluation: Pt is a 74 yo female admitted following a mechanical fall while walking dogs with her granddaughter. found to have minimally displaced nasal fracture and small subdural hematoma. She presents at her baseline this date with no concerns for d/c home alone, no balance or safety deficits noted, owns all DME needed, observed pt complete upright activity (I) this date. plans home and pt ok to discharge.      Anticipated Discharge Disposition (OT): home

## 2023-10-27 NOTE — PROGRESS NOTES
Case Management Discharge Note      Final Note: Pt discharged home via family transport. PT/OT signed off, pt ambulating independently, no needs noted. Joyce Oliver LCSW         Selected Continued Care - Discharged on 10/26/2023 Admission date: 10/25/2023 - Discharge disposition: Home or Self Care      Destination    No services have been selected for the patient.                Durable Medical Equipment    No services have been selected for the patient.                Dialysis/Infusion    No services have been selected for the patient.                Home Medical Care    No services have been selected for the patient.                Therapy    No services have been selected for the patient.                Community Resources    No services have been selected for the patient.                Community & DME    No services have been selected for the patient.                    Transportation Services  Private: Car    Final Discharge Disposition Code: 01 - home or self-care

## 2023-11-08 DIAGNOSIS — S06.5XAA SUBDURAL HEMATOMA: Primary | ICD-10-CM

## 2023-11-09 ENCOUNTER — TELEPHONE (OUTPATIENT)
Dept: NEUROSURGERY | Facility: CLINIC | Age: 73
End: 2023-11-09
Payer: MEDICARE

## 2023-11-09 NOTE — TELEPHONE ENCOUNTER
LVM: Need to inform patient of follow up with Carmelina Mcginnis and that we are scheduling Head Ct as well. Will mail this info to her too.     [facility] can read.       [facility] called, cannot schedule CT due to [employer] insurance. Has to go outside of [facility]. Faxed order to [facility] who will call patient to schedule. Sent patient info in the mail as well.

## 2023-11-17 ENCOUNTER — TELEPHONE (OUTPATIENT)
Dept: NEUROSURGERY | Facility: CLINIC | Age: 73
End: 2023-11-17
Payer: MEDICARE

## 2023-11-17 NOTE — PROGRESS NOTES
Subjective   Patient ID: Vanessa Dorsey is a 73 y.o. female is here today for follow-up wit CT done at [facility] on 11/24/2023. Occasional headaches.     History of Present Illness    She returns to the office today for ongoing follow-up with history of a traumatic subdural hematoma status post fall in October 2023.  She follows up with a new head CT today.    Today, she reports that she is doing and feeling very well.  She has started physical therapy, which she was in the process of doing when she had her fall.  She feels that her balance and gait are stable and steady.  She denies any new falls.  She denies any dizziness or lightheadedness, but she does get fleeting headaches over the left eye that lasts for just a few seconds, that come and go.  She also sustained trauma to the left orbit requiring sutures.  She feels that she is mostly back to normal.  She is driving without any problem or concern.    She presents unaccompanied.      The following portions of the patient's history were reviewed and updated as appropriate: allergies, current medications, past family history, past medical history, past social history, past surgical history, and problem list.    Review of Systems   Constitutional:  Negative for chills and fever.   HENT:  Negative for facial swelling.    Eyes:  Negative for visual disturbance.   Gastrointestinal:  Negative for nausea and vomiting.   Musculoskeletal:  Negative for gait problem.   Skin:  Negative for wound (redness, drainage, swelling).   Neurological:  Positive for headaches. Negative for dizziness, seizures, facial asymmetry, speech difficulty, weakness, light-headedness and numbness.   Psychiatric/Behavioral:  Negative for confusion and sleep disturbance.        /62   Pulse 71   SpO2 98%       Objective     Vitals:    11/28/23 1142   BP: 108/62   Pulse: 71   SpO2: 98%     There is no height or weight on file to calculate BMI.    Tobacco Use: Medium Risk (11/28/2023)     Patient History     Smoking Tobacco Use: Former     Smokeless Tobacco Use: Never     Passive Exposure: Not on file          Physical Exam  Vitals reviewed.   Constitutional:       Appearance: Normal appearance.      Comments: Very pleasant, well-appearing older female   HENT:      Head: Atraumatic.   Pulmonary:      Effort: Pulmonary effort is normal.   Musculoskeletal:         General: Normal range of motion.      Comments: Moving all extremities well, strength intact throughout   Skin:     General: Skin is warm and dry.   Neurological:      Mental Status: She is alert and oriented to person, place, and time.      GCS: GCS eye subscore is 4. GCS verbal subscore is 5. GCS motor subscore is 6.      Sensory: No sensory deficit.      Motor: No weakness or tremor.      Gait: Gait normal.      Comments: Gait is stable and upright, nonantalgic  Cranial nerves appear grossly intact  Negative drift, negative tremors  Finger-nose intact bilateral upper extremities  answering all questions and following commands appropriately       Neurologic Exam     Mental Status   Oriented to person, place, and time.           Assessment & Plan   Independent Review of Radiographic Studies:      I personally reviewed the images from the following studies.    CT head without contrast from [facility] dated November 24, 2023 reveals redemonstration of a hyperdensity in the right anterior parafalcine consistent with a small meningioma.  No convincing evidence of acute intracranial hemorrhage.    Medical Decision Making:      She presents office today for first follow-up status post fall resulting in a traumatic subdural hematoma.  Exam as noted above, no red flags.  She is doing very well.  She denies any major issues at this time.  I was finally able to review her follow-up head CT from [facility] that was completed a couple of days ago.  It does show stable findings with no area of acute intracranial hemorrhage.  We will have her follow-up with  an additional head CT here at [facility] in a few weeks just for confirmation that she is doing well.  She is to call at anytime with any additional questions or concerns.    Plan: Follow-up with head CT in 3 weeks    Diagnoses and all orders for this visit:    1. Subdural hematoma (Primary)  -     CT Head Without Contrast; Future      Return in about 3 weeks (around 12/19/2023).

## 2023-11-17 NOTE — TELEPHONE ENCOUNTER
Caller: KAROLYN    Relationship: [facility] AUTHORIZATION    Best call back number: [phone]    What form or medical record are you requesting: CLINICAL DOCUMENTATION TO SUPPORT IMAGING    Who is requesting this form or medical record from you: [facility] IMAGING    How would you like to receive the form or medical records (pick-up, mail, fax): FAX  If fax, what is the fax number: [phone]  If mail, what is the address:   If pick-up, provide patient with address and location details    Timeframe paperwork needed: ASAP    Additional notes: KAROLYN FROM [facility] STATES THAT SHE NEEDS CLINICAL NOTES SO SHE IS ABLE TO GET THE PATIENTS IMAGING AUTHORIZED-PT IS SCHEDULED FOR 11/20/23 AT 1:00 P.M.-SENDING TO OFFICE TO ADVISE-KAROLYN STATES THIS IS BEING SENT TO URGENT FAX LINE THANK YOU

## 2023-11-28 ENCOUNTER — OFFICE VISIT (OUTPATIENT)
Dept: NEUROSURGERY | Facility: CLINIC | Age: 73
End: 2023-11-28
Payer: MEDICARE

## 2023-11-28 VITALS — OXYGEN SATURATION: 98 % | DIASTOLIC BLOOD PRESSURE: 62 MMHG | HEART RATE: 71 BPM | SYSTOLIC BLOOD PRESSURE: 108 MMHG

## 2023-11-28 DIAGNOSIS — S06.5XAA SUBDURAL HEMATOMA: Primary | ICD-10-CM

## 2023-11-29 ENCOUNTER — TELEPHONE (OUTPATIENT)
Dept: NEUROSURGERY | Facility: CLINIC | Age: 73
End: 2023-11-29
Payer: MEDICARE

## 2023-11-29 NOTE — TELEPHONE ENCOUNTER
----- Message from ARMOND Jean sent at 11/28/2023  4:32 PM EST -----  Please let the patient know that her head CT is stable.  Lets have her get an additional CT in a couple of weeks here at [facility] just for confirmation that the subdural is healing.  She is to call in interim with any questions or concerns.  Please arrange a follow-up in 3 weeks with the head CT. Thanks!!

## 2023-11-29 NOTE — TELEPHONE ENCOUNTER
Called patient to advise of Carmelina's message and patient expressed understanding. She will call [facility] to get CT scheduled and will follow up with us after she gets that Ct scheduled.

## 2023-12-01 NOTE — TELEPHONE ENCOUNTER
Called patient to see if she had scheduled CT for f/u on 12/20 she advised that she is waiting to hear back from [employer] to see if they have financial assistance. Her last CT was $200 and she cannot afford to pay that as she just got the CT about a week a go.

## 2023-12-13 ENCOUNTER — TELEPHONE (OUTPATIENT)
Dept: NEUROSURGERY | Facility: CLINIC | Age: 73
End: 2023-12-13
Payer: MEDICARE

## 2023-12-13 NOTE — TELEPHONE ENCOUNTER
Tried calling patient went to put Auth in for [facility] and it is listed as a place that you would pay a higher deduct to go there. [employer] is fine. Tried notify patient left vmail. Called [facility] to make them aware that I wanted to speak to patient before just doing Auth at this time.

## 2023-12-14 NOTE — TELEPHONE ENCOUNTER
Got [facility] Authorized. Patient is aware they are out of network but willing to pay out of pocket expense. Called [facility] and gave auth number to them along with approval dates.

## 2023-12-15 NOTE — PROGRESS NOTES
"Subjective   Patient ID: Vanessa Dorsey is a 73 y.o. female is here today for follow-up with CT head done on 12/15/2023 for right-sided subdural hematoma after mechanical fall on 10/25/2023.  She was last seen in the office 11/28/2023 with repeat CT head.    Today patient reports that she has L sided HA's intermittently, patient is also having pain intermittently over her L eye where she had sutures post fall 10/25/2023.  No visual changes    Headache  Previous testing:  MRI  Laterality:  Left side only  Location:  Temples/sides  Pain severity:  5  Headaches last more than three days?: No        The following portions of the patient's history were reviewed and updated as appropriate: allergies, current medications, past family history, past medical history, past social history, past surgical history, and problem list.    Review of Systems   Constitutional:  Negative for chills and fever.   HENT:  Negative for congestion.    Eyes:  Negative for visual disturbance.   Musculoskeletal:  Negative for gait problem.   Neurological:  Positive for headaches. Negative for dizziness and light-headedness.       Objective     Vitals:    12/20/23 1325   BP: 118/75   Cuff Size: Adult   Pulse: 97   Temp: 96.8 °F (36 °C)   SpO2: 99%   Weight: 57.6 kg (127 lb)   Height: 162.6 cm (64\")     Body mass index is 21.8 kg/m².    Tobacco Use: Medium Risk (12/20/2023)    Patient History     Smoking Tobacco Use: Former     Smokeless Tobacco Use: Never     Passive Exposure: Not on file          Physical Exam  Vitals reviewed.   Constitutional:       Appearance: Normal appearance.   HENT:      Head: Normocephalic and atraumatic.      Comments: Normal temporal artery pulsations.  Nontender  Eyes:      Extraocular Movements: EOM normal.   Pulmonary:      Effort: Pulmonary effort is normal.   Neurological:      General: No focal deficit present.      Mental Status: She is alert.      Coordination: Romberg Test normal.      Gait: Gait is intact. "   Psychiatric:         Mood and Affect: Mood normal.         Speech: Speech normal.         Thought Content: Thought content normal.       Neurologic Exam     Mental Status   Speech: speech is normal   Level of consciousness: alert  Knowledge: good.   Normal comprehension.     Cranial Nerves     CN III, IV, VI   Extraocular motions are normal.   Right pupil: Size: 3 mm. Shape: regular.   Left pupil: Size: 3 mm. Shape: regular.   CN III: no CN III palsy  CN VI: no CN VI palsy    CN V   Facial sensation intact.     CN VII   Facial expression full, symmetric.     CN VIII   CN VIII normal.     Motor Exam   Right arm pronator drift: absent  Left arm pronator drift: absent    Gait, Coordination, and Reflexes     Gait  Gait: normal    Coordination   Romberg: negative      Assessment & Plan   Independent Review of Radiographic Studies:      I personally reviewed the images from the following studies.    CT head reveals stable hyperdensity right parafalcine when compared to prior CT images.  Looking back at older studies in [facility], patient has diagnosed right parafalcine meningioma of equivalent size.  There is also a left frontal mass appearance equivalent to bone density that may be a small meningioma as well.  This was not reported on the prior MRI studies, but is reported and stable on the follow-up CT head.  No evidence of hemorrhage. Incidentally reported is right maxillary sinusitis that is mildly improved from prior study.    Medical Decision Making:      Patient presents for ongoing follow-up of hyperdensity in the right anterior parafalcine area after a fall.  CT head is unchanged.  After further review of history, patient has had MRI studies over the last few years to follow a right parafalcine meningioma.  Although the images are not available for my review at the moment, based on imaging measurements, it does not appear to be changed.  There is a left frontal hyperdense mass adjacent to the skull which actually  may just be a skull deformity as it is dense his bone or it may be a calcified meningioma.  This is not mentioned in the prior MRI studies which suggest to me that it is probably just a bony abnormality as it would be nearly impossible for a calcified meningioma to occur since April of this year.  However, she reports that she is already scheduled for a follow-up MRI with a neurosurgeon sometime in the spring for the meningiomas.  Advised her would be happy to follow her, due to insurance restrictions and the fact she is already established with someone else, she like to continue with their care at this time.  She will let us know if she changes her mind.  Otherwise, she needs no formal follow-up with our service.  She does complain of some left temporal headaches but she does not have any pulsatile mass and is likely discomfort from the injury.  I did advise her of the sinusitis findings that are persistent on the CT scan advised her to speak to her family doctor about this.    Diagnoses and all orders for this visit:    1. right parafalcine meningioma (Primary)      Return if symptoms worsen or fail to improve.

## 2023-12-20 ENCOUNTER — OFFICE VISIT (OUTPATIENT)
Dept: NEUROSURGERY | Facility: CLINIC | Age: 73
End: 2023-12-20
Payer: MEDICARE

## 2023-12-20 VITALS
WEIGHT: 127 LBS | HEIGHT: 64 IN | BODY MASS INDEX: 21.68 KG/M2 | SYSTOLIC BLOOD PRESSURE: 118 MMHG | HEART RATE: 97 BPM | OXYGEN SATURATION: 99 % | DIASTOLIC BLOOD PRESSURE: 75 MMHG | TEMPERATURE: 96.8 F

## 2023-12-20 DIAGNOSIS — D32.9 BENIGN MENINGIOMA: Primary | ICD-10-CM

## 2023-12-20 PROBLEM — S06.5XAA SUBDURAL HEMATOMA: Status: RESOLVED | Noted: 2023-10-25 | Resolved: 2023-12-20

## 2023-12-20 PROCEDURE — 99213 OFFICE O/P EST LOW 20 MIN: CPT | Performed by: NURSE PRACTITIONER

## 2023-12-20 PROCEDURE — 3074F SYST BP LT 130 MM HG: CPT | Performed by: NURSE PRACTITIONER

## 2023-12-20 PROCEDURE — 3078F DIAST BP <80 MM HG: CPT | Performed by: NURSE PRACTITIONER

## (undated) DEVICE — SYRINGE, LUER LOCK, 60ML: Brand: MEDLINE

## (undated) DEVICE — PK KN TOTL 40

## (undated) DEVICE — UNDERCAST PADDING: Brand: DEROYAL

## (undated) DEVICE — CONTAINER,SPECIMEN,OR STERILE,4OZ: Brand: MEDLINE

## (undated) DEVICE — DRAPE,REIN 53X77,STERILE: Brand: MEDLINE

## (undated) DEVICE — GLV SURG BIOGEL LTX PF 7 1/2

## (undated) DEVICE — NEEDLE, QUINCKE, 20GX3.5": Brand: MEDLINE

## (undated) DEVICE — BNDG ELAS ELITE V/CLOSE 4IN 5YD LF STRL

## (undated) DEVICE — PENCL E/S ULTRAVAC TELESCP NOSE HOLSTR 10FT

## (undated) DEVICE — ANTIBACTERIAL UNDYED BRAIDED (POLYGLACTIN 910), SYNTHETIC ABSORBABLE SUTURE: Brand: COATED VICRYL

## (undated) DEVICE — TRAP FLD MINIVAC MEGADYNE 100ML

## (undated) DEVICE — DRSNG GZ PETROLTM XEROFORM CURAD 1X8IN STRL

## (undated) DEVICE — DUAL CUT SAGITTAL BLADE

## (undated) DEVICE — APPL CHLORAPREP HI/LITE 26ML ORNG

## (undated) DEVICE — STPLR SKIN VISISTAT WD 35CT

## (undated) DEVICE — DECANT BG O JET

## (undated) DEVICE — GLV SURG PREMIERPRO ORTHO LTX PF SZ7.5 BRN

## (undated) DEVICE — KT DRN EVAC WND PVC PCH WTROC RND 10F400

## (undated) DEVICE — PAD,ABDOMINAL,8"X10",ST,LF: Brand: MEDLINE